# Patient Record
Sex: FEMALE | Race: BLACK OR AFRICAN AMERICAN | HISPANIC OR LATINO | Employment: FULL TIME | RURAL
[De-identification: names, ages, dates, MRNs, and addresses within clinical notes are randomized per-mention and may not be internally consistent; named-entity substitution may affect disease eponyms.]

---

## 2021-06-17 RX ORDER — LAMOTRIGINE 200 MG/1
200 TABLET ORAL DAILY
COMMUNITY

## 2021-06-17 RX ORDER — PREDNISONE 5 MG/1
5 TABLET ORAL DAILY
COMMUNITY
End: 2022-09-18

## 2021-06-17 RX ORDER — HYDROXYCHLOROQUINE SULFATE 200 MG/1
200 TABLET, FILM COATED ORAL DAILY
COMMUNITY

## 2021-06-17 RX ORDER — NAPROXEN 500 MG/1
500 TABLET ORAL 2 TIMES DAILY
COMMUNITY

## 2021-06-17 RX ORDER — LISINOPRIL AND HYDROCHLOROTHIAZIDE 20; 25 MG/1; MG/1
1 TABLET ORAL DAILY
COMMUNITY

## 2021-06-17 RX ORDER — LEVOTHYROXINE SODIUM 125 UG/1
150 TABLET ORAL
COMMUNITY

## 2021-06-17 RX ORDER — QUETIAPINE FUMARATE 300 MG/1
TABLET, FILM COATED ORAL
COMMUNITY

## 2021-06-22 ENCOUNTER — OFFICE VISIT (OUTPATIENT)
Dept: OBSTETRICS AND GYNECOLOGY | Facility: CLINIC | Age: 51
End: 2021-06-22
Payer: COMMERCIAL

## 2021-06-22 VITALS
DIASTOLIC BLOOD PRESSURE: 71 MMHG | HEIGHT: 62 IN | BODY MASS INDEX: 51.16 KG/M2 | WEIGHT: 278 LBS | SYSTOLIC BLOOD PRESSURE: 126 MMHG | RESPIRATION RATE: 18 BRPM | HEART RATE: 88 BPM | TEMPERATURE: 97 F

## 2021-06-22 DIAGNOSIS — Z12.72 SCREENING FOR VAGINAL CANCER: Primary | ICD-10-CM

## 2021-06-22 DIAGNOSIS — Z01.419 ROUTINE GYNECOLOGICAL EXAMINATION: ICD-10-CM

## 2021-06-22 PROCEDURE — 88142 CYTOPATH C/V THIN LAYER: CPT | Mod: GCY | Performed by: OBSTETRICS & GYNECOLOGY

## 2021-06-22 PROCEDURE — 99396 PR PREVENTIVE VISIT,EST,40-64: ICD-10-PCS | Mod: ,,, | Performed by: OBSTETRICS & GYNECOLOGY

## 2021-06-22 PROCEDURE — 87624 HPV HI-RISK TYP POOLED RSLT: CPT | Mod: ,,, | Performed by: CLINICAL MEDICAL LABORATORY

## 2021-06-22 PROCEDURE — 99396 PREV VISIT EST AGE 40-64: CPT | Mod: ,,, | Performed by: OBSTETRICS & GYNECOLOGY

## 2021-06-22 PROCEDURE — 87624 HUMAN PAPILLOMAVIRUS (HPV): ICD-10-PCS | Mod: ,,, | Performed by: CLINICAL MEDICAL LABORATORY

## 2021-06-22 RX ORDER — SULFASALAZINE 500 MG/1
1000 TABLET, DELAYED RELEASE ORAL 2 TIMES DAILY
COMMUNITY
End: 2022-09-18

## 2021-06-22 RX ORDER — ERGOCALCIFEROL 1.25 MG/1
50000 CAPSULE ORAL
COMMUNITY

## 2021-06-22 RX ORDER — LEVOTHYROXINE SODIUM 150 UG/1
150 TABLET ORAL
COMMUNITY

## 2021-06-23 LAB
GH SERPL-MCNC: NORMAL NG/ML
INSULIN SERPL-ACNC: NORMAL U[IU]/ML
LAB AP CLINICAL INFORMATION: NORMAL
LAB AP GYN INTERPRETATION: NEGATIVE
LAB AP PAP DISCLAIMER COMMENTS: NORMAL
RENIN PLAS-CCNC: NORMAL NG/ML/H

## 2021-06-26 LAB
HPV 16: NEGATIVE
HPV 18: NEGATIVE
HPV OTHER: NEGATIVE

## 2021-10-25 ENCOUNTER — OFFICE VISIT (OUTPATIENT)
Dept: FAMILY MEDICINE | Facility: CLINIC | Age: 51
End: 2021-10-25
Payer: COMMERCIAL

## 2021-10-25 VITALS
DIASTOLIC BLOOD PRESSURE: 90 MMHG | RESPIRATION RATE: 18 BRPM | TEMPERATURE: 98 F | OXYGEN SATURATION: 98 % | WEIGHT: 278 LBS | HEIGHT: 62 IN | SYSTOLIC BLOOD PRESSURE: 132 MMHG | HEART RATE: 79 BPM | BODY MASS INDEX: 51.16 KG/M2

## 2021-10-25 DIAGNOSIS — R60.0 LEG EDEMA, LEFT: Primary | ICD-10-CM

## 2021-10-25 LAB
ALBUMIN SERPL BCP-MCNC: 3.6 G/DL (ref 3.5–5)
ALBUMIN/GLOB SERPL: 1 {RATIO}
ALP SERPL-CCNC: 79 U/L (ref 41–108)
ALT SERPL W P-5'-P-CCNC: 34 U/L (ref 13–56)
ANION GAP SERPL CALCULATED.3IONS-SCNC: 8 MMOL/L (ref 7–16)
AST SERPL W P-5'-P-CCNC: 28 U/L (ref 15–37)
BASOPHILS # BLD AUTO: 0.04 K/UL (ref 0–0.2)
BASOPHILS NFR BLD AUTO: 0.4 % (ref 0–1)
BILIRUB SERPL-MCNC: 0.3 MG/DL (ref 0–1.2)
BUN SERPL-MCNC: 11 MG/DL (ref 7–18)
BUN/CREAT SERPL: 15 (ref 6–20)
CALCIUM SERPL-MCNC: 9.9 MG/DL (ref 8.5–10.1)
CHLORIDE SERPL-SCNC: 104 MMOL/L (ref 98–107)
CO2 SERPL-SCNC: 34 MMOL/L (ref 21–32)
CREAT SERPL-MCNC: 0.74 MG/DL (ref 0.55–1.02)
D DIMER PPP FEU-MCNC: 0.29 ΜG/ML (ref 0–0.47)
DIFFERENTIAL METHOD BLD: ABNORMAL
EOSINOPHIL # BLD AUTO: 0.01 K/UL (ref 0–0.5)
EOSINOPHIL NFR BLD AUTO: 0.1 % (ref 1–4)
ERYTHROCYTE [DISTWIDTH] IN BLOOD BY AUTOMATED COUNT: 14.8 % (ref 11.5–14.5)
GLOBULIN SER-MCNC: 3.5 G/DL (ref 2–4)
GLUCOSE SERPL-MCNC: 94 MG/DL (ref 74–106)
HCT VFR BLD AUTO: 40.3 % (ref 38–47)
HGB BLD-MCNC: 13.9 G/DL (ref 12–16)
IMM GRANULOCYTES # BLD AUTO: 0.07 K/UL (ref 0–0.04)
IMM GRANULOCYTES NFR BLD: 0.8 % (ref 0–0.4)
LYMPHOCYTES # BLD AUTO: 2.96 K/UL (ref 1–4.8)
LYMPHOCYTES NFR BLD AUTO: 32.6 % (ref 27–41)
MCH RBC QN AUTO: 28.7 PG (ref 27–31)
MCHC RBC AUTO-ENTMCNC: 34.5 G/DL (ref 32–36)
MCV RBC AUTO: 83.3 FL (ref 80–96)
MONOCYTES # BLD AUTO: 0.63 K/UL (ref 0–0.8)
MONOCYTES NFR BLD AUTO: 6.9 % (ref 2–6)
MPC BLD CALC-MCNC: 11.4 FL (ref 9.4–12.4)
NEUTROPHILS # BLD AUTO: 5.38 K/UL (ref 1.8–7.7)
NEUTROPHILS NFR BLD AUTO: 59.2 % (ref 53–65)
NRBC # BLD AUTO: 0 X10E3/UL
NRBC, AUTO (.00): 0 %
PLATELET # BLD AUTO: 295 K/UL (ref 150–400)
POTASSIUM SERPL-SCNC: 3.4 MMOL/L (ref 3.5–5.1)
PROT SERPL-MCNC: 7.1 G/DL (ref 6.4–8.2)
RBC # BLD AUTO: 4.84 M/UL (ref 4.2–5.4)
SODIUM SERPL-SCNC: 143 MMOL/L (ref 136–145)
WBC # BLD AUTO: 9.09 K/UL (ref 4.5–11)

## 2021-10-25 PROCEDURE — 85025 CBC WITH DIFFERENTIAL: ICD-10-PCS | Mod: ,,, | Performed by: CLINICAL MEDICAL LABORATORY

## 2021-10-25 PROCEDURE — 99203 OFFICE O/P NEW LOW 30 MIN: CPT | Mod: ,,, | Performed by: FAMILY MEDICINE

## 2021-10-25 PROCEDURE — 80053 COMPREHEN METABOLIC PANEL: CPT | Mod: ,,, | Performed by: CLINICAL MEDICAL LABORATORY

## 2021-10-25 PROCEDURE — 85025 COMPLETE CBC W/AUTO DIFF WBC: CPT | Mod: ,,, | Performed by: CLINICAL MEDICAL LABORATORY

## 2021-10-25 PROCEDURE — 80053 COMPREHENSIVE METABOLIC PANEL: ICD-10-PCS | Mod: ,,, | Performed by: CLINICAL MEDICAL LABORATORY

## 2021-10-25 PROCEDURE — 85378 FIBRIN DEGRADE SEMIQUANT: CPT | Performed by: FAMILY MEDICINE

## 2021-10-25 PROCEDURE — 99203 PR OFFICE/OUTPT VISIT, NEW, LEVL III, 30-44 MIN: ICD-10-PCS | Mod: ,,, | Performed by: FAMILY MEDICINE

## 2021-10-25 RX ORDER — IBUPROFEN 600 MG/1
600 TABLET ORAL EVERY 6 HOURS PRN
Qty: 20 TABLET | Refills: 0 | Status: SHIPPED | OUTPATIENT
Start: 2021-10-25

## 2021-10-25 RX ORDER — SULFASALAZINE 500 MG/1
500 TABLET ORAL 2 TIMES DAILY
COMMUNITY
Start: 2021-10-13 | End: 2021-10-25

## 2021-10-25 RX ORDER — GABAPENTIN 300 MG/1
300 CAPSULE ORAL 3 TIMES DAILY PRN
Qty: 20 CAPSULE | Refills: 1 | Status: SHIPPED | OUTPATIENT
Start: 2021-10-25 | End: 2022-10-25

## 2021-10-25 RX ORDER — HYDROCHLOROTHIAZIDE 25 MG/1
TABLET ORAL
COMMUNITY
Start: 2021-08-19

## 2022-01-13 ENCOUNTER — TELEPHONE (OUTPATIENT)
Dept: OBSTETRICS AND GYNECOLOGY | Facility: CLINIC | Age: 52
End: 2022-01-13
Payer: COMMERCIAL

## 2022-01-13 NOTE — TELEPHONE ENCOUNTER
----- Message from Shikha Stein sent at 1/13/2022  3:23 PM CST -----  PT NEEDS REFILL, LETY ,  [place]

## 2022-01-13 NOTE — TELEPHONE ENCOUNTER
Verbal Rx per Dr. Lopez w/read back: Premarin 0.625 mg, 1 tab by mouth daily, dispense 90, refill 1    Contacted Mr [facility] at [phone]; Verbal Rx given to [facility] w/read back

## 2022-05-04 ENCOUNTER — TELEPHONE (OUTPATIENT)
Dept: OBSTETRICS AND GYNECOLOGY | Facility: CLINIC | Age: 52
End: 2022-05-04
Payer: COMMERCIAL

## 2022-05-04 NOTE — TELEPHONE ENCOUNTER
----- Message from Shikha Stein sent at 5/3/2022  2:29 PM CDT -----  PT NEEDS REFILL ON HORMONE PILLS,  [place], [phone]

## 2022-07-25 ENCOUNTER — OFFICE VISIT (OUTPATIENT)
Dept: OBSTETRICS AND GYNECOLOGY | Facility: CLINIC | Age: 52
End: 2022-07-25
Payer: COMMERCIAL

## 2022-07-25 VITALS
DIASTOLIC BLOOD PRESSURE: 79 MMHG | BODY MASS INDEX: 51.23 KG/M2 | RESPIRATION RATE: 18 BRPM | HEIGHT: 62 IN | HEART RATE: 80 BPM | WEIGHT: 278.38 LBS | SYSTOLIC BLOOD PRESSURE: 133 MMHG

## 2022-07-25 DIAGNOSIS — Z12.72 ENCOUNTER FOR SCREENING FOR MALIGNANT NEOPLASM OF VAGINA: Primary | ICD-10-CM

## 2022-07-25 PROCEDURE — 88142 CYTOPATH C/V THIN LAYER: CPT | Mod: GCY | Performed by: OBSTETRICS & GYNECOLOGY

## 2022-07-25 PROCEDURE — 99396 PREV VISIT EST AGE 40-64: CPT | Mod: ,,, | Performed by: OBSTETRICS & GYNECOLOGY

## 2022-07-25 PROCEDURE — 99396 PR PREVENTIVE VISIT,EST,40-64: ICD-10-PCS | Mod: ,,, | Performed by: OBSTETRICS & GYNECOLOGY

## 2022-07-25 NOTE — PROGRESS NOTES
"CC: Well woman exam    Dania Felton is a 51 y.o. female  presents for well woman exam.  LMP: No LMP recorded. Patient has had a hysterectomy..  No issues, problems, or complaints.    Last mammogram: 2022 at [facility]    Past Medical History:   Diagnosis Date    Bipolar 1 disorder     History of chlamydia     Hypertension     Hypothyroid     RA (rheumatoid arthritis)     Screening for vaginal cancer 2016    negative     Past Surgical History:   Procedure Laterality Date    APPENDECTOMY       SECTION      COLLATERAL LIGAMENT REPAIR, KNEE Right     ENDOMETRIAL BIOPSY  2012    HYSTERECTOMY  07/10/2012    [facility]    OOPHORECTOMY Bilateral 07/10/2012    OVARIAN CYST REMOVAL      TOTAL KNEE ARTHROPLASTY Left     TUBAL LIGATION  2002     Social History     Socioeconomic History    Marital status:    Tobacco Use    Smoking status: Never Smoker    Smokeless tobacco: Never Used   Substance and Sexual Activity    Alcohol use: Yes     Comment: socially    Drug use: Never    Sexual activity: Yes     Partners: Male     Birth control/protection: See Surgical Hx     Family History   Problem Relation Age of Onset    Hypertension Father     Hypertension Mother     No Known Problems Son     No Known Problems Daughter      OB History        3    Para   3    Term   2       1    AB        Living   2       SAB        IAB        Ectopic        Multiple        Live Births   2                 /79   Pulse 80   Resp 18   Ht 5' 2" (1.575 m)   Wt 126.3 kg (278 lb 6.4 oz)   BMI 50.92 kg/m²       ROS:  GENERAL: Denies weight gain or weight loss. Feeling well overall.   SKIN: Denies rash or lesions.   HEAD: Denies head injury or headache.   NODES: Denies enlarged lymph nodes.   CHEST: Denies chest pain or shortness of breath.   CARDIOVASCULAR: Denies palpitations or left sided chest pain.   ABDOMEN: No abdominal pain, constipation, diarrhea, nausea, " vomiting or rectal bleeding.   URINARY: No frequency, dysuria, hematuria, or burning on urination.  REPRODUCTIVE: See HPI.   BREASTS: The patient performs breast self-examination and denies pain, lumps, or nipple discharge.   HEMATOLOGIC: No easy bruisability or excessive bleeding.   MUSCULOSKELETAL: Denies joint pain or swelling.   NEUROLOGIC: Denies syncope or weakness.   PSYCHIATRIC: Denies depression, anxiety or mood swings.    PHYSICAL EXAM:  APPEARANCE: Well nourished, well developed, in no acute distress.  AFFECT: WNL, alert and oriented x 3  SKIN: No acne or hirsutism  NECK: Neck symmetric without masses or thyromegaly  NODES: No inguinal, cervical, axillary, or femoral lymph node enlargement  CHEST: Good respiratory effect  ABDOMEN: Soft.  No tenderness or masses.  No hepatosplenomegaly.  No hernias.  BREASTS: Symmetrical, no skin changes or visible lesions.  No palpable masses, nipple discharge bilaterally.  PELVIC: Normal external genitalia without lesions.  Normal hair distribution.  Adequate perineal body, normal urethral meatus.  Vagina moist and well rugated without lesions or discharge.  Cervix  and uterus surgically absent. Adnexa without masses or tenderness.    EXTREMITIES: No edema.    Encounter for screening for malignant neoplasm of vagina  -     ThinPrep Pap Test; Future; Expected date: 07/25/2022            Patient was counseled today on A.C.S. Pap guidelines and recommendations for yearly pelvic exams, mammograms and monthly self breast exams; to see her PCP for other health maintenance.   Exercise regimen encouraged  Healthy food choices encouraged  Questions answered to desired level of satisfaction  Verbalized understanding to all information and instructions    Follow up in about 1 year (around 7/25/2023) for Annual Exam.

## 2022-08-03 ENCOUNTER — TELEPHONE (OUTPATIENT)
Dept: OBSTETRICS AND GYNECOLOGY | Facility: CLINIC | Age: 52
End: 2022-08-03
Payer: COMMERCIAL

## 2022-08-03 NOTE — TELEPHONE ENCOUNTER
----- Message from Jim Keith sent at 8/2/2022  2:14 PM CDT -----  Patient called to get more refills on her premirin, wanted to get the refills before she went out of town tomorrow. They can be sent to Mr. Castillo in [place]. She can be reached @ [phone]

## 2022-08-03 NOTE — TELEPHONE ENCOUNTER
Verbal Rx per Dr. Lopez w/read back: Premarin 0.625 mg, 1 tab PO once daily, dispense 90, refill 4    Contacted Mr. Anna Cervantes at [phone]; Verbal Rx given to [facility] w/read back

## 2022-09-18 ENCOUNTER — OFFICE VISIT (OUTPATIENT)
Dept: FAMILY MEDICINE | Facility: CLINIC | Age: 52
End: 2022-09-18
Payer: COMMERCIAL

## 2022-09-18 VITALS
DIASTOLIC BLOOD PRESSURE: 82 MMHG | HEIGHT: 62 IN | HEART RATE: 85 BPM | TEMPERATURE: 98 F | SYSTOLIC BLOOD PRESSURE: 126 MMHG | RESPIRATION RATE: 20 BRPM | BODY MASS INDEX: 50.24 KG/M2 | OXYGEN SATURATION: 96 % | WEIGHT: 273 LBS

## 2022-09-18 DIAGNOSIS — Z77.22 SECOND HAND SMOKE EXPOSURE: ICD-10-CM

## 2022-09-18 DIAGNOSIS — R05.8 DRY COUGH: ICD-10-CM

## 2022-09-18 DIAGNOSIS — J32.0 MAXILLARY SINUSITIS, UNSPECIFIED CHRONICITY: Primary | ICD-10-CM

## 2022-09-18 DIAGNOSIS — R44.8 FACIAL PRESSURE: ICD-10-CM

## 2022-09-18 DIAGNOSIS — J37.0 CHRONIC LARYNGITIS: ICD-10-CM

## 2022-09-18 DIAGNOSIS — R09.82 POST-NASAL DRAINAGE: ICD-10-CM

## 2022-09-18 PROBLEM — D72.829 LEUKOCYTOSIS: Status: ACTIVE | Noted: 2022-09-18

## 2022-09-18 PROBLEM — M06.9 RHEUMATOID ARTHRITIS: Status: ACTIVE | Noted: 2022-09-18

## 2022-09-18 PROBLEM — E66.9 OBESITY: Status: ACTIVE | Noted: 2022-09-18

## 2022-09-18 PROBLEM — F31.70: Status: ACTIVE | Noted: 2022-09-18

## 2022-09-18 PROBLEM — E03.9 HYPOTHYROIDISM: Status: ACTIVE | Noted: 2022-09-18

## 2022-09-18 PROBLEM — M25.571 BILATERAL ANKLE JOINT PAIN: Status: ACTIVE | Noted: 2022-09-18

## 2022-09-18 PROBLEM — M25.572 BILATERAL ANKLE JOINT PAIN: Status: ACTIVE | Noted: 2022-09-18

## 2022-09-18 PROBLEM — G47.33 OBSTRUCTIVE SLEEP APNEA (ADULT) (PEDIATRIC): Status: ACTIVE | Noted: 2022-09-18

## 2022-09-18 PROBLEM — H92.09 OTALGIA, UNSPECIFIED EAR: Status: ACTIVE | Noted: 2022-09-18

## 2022-09-18 PROBLEM — H60.92 UNSPECIFIED OTITIS EXTERNA, LEFT EAR: Status: ACTIVE | Noted: 2022-09-18

## 2022-09-18 PROBLEM — E78.5 HYPERLIPIDEMIA: Status: ACTIVE | Noted: 2022-09-18

## 2022-09-18 PROBLEM — M25.50 ARTHRALGIA: Status: ACTIVE | Noted: 2022-09-18

## 2022-09-18 PROBLEM — Z55.9 EDUCATIONAL CIRCUMSTANCE: Status: ACTIVE | Noted: 2022-09-18

## 2022-09-18 PROBLEM — F31.32 BIPOLAR AFFECTIVE DISORDER, CURRENTLY DEPRESSED, MODERATE: Status: ACTIVE | Noted: 2022-09-18

## 2022-09-18 PROBLEM — M19.90 OSTEOARTHRITIS: Status: ACTIVE | Noted: 2022-09-18

## 2022-09-18 PROBLEM — Z71.89 OTHER SPECIFIED COUNSELING: Status: ACTIVE | Noted: 2022-09-18

## 2022-09-18 PROBLEM — F34.1 NEUROTIC DEPRESSION: Status: ACTIVE | Noted: 2022-09-18

## 2022-09-18 PROBLEM — F31.9 BIPOLAR DISORDER, UNSPECIFIED: Status: ACTIVE | Noted: 2022-09-18

## 2022-09-18 PROBLEM — E55.9 VITAMIN D DEFICIENCY: Status: ACTIVE | Noted: 2022-09-18

## 2022-09-18 PROBLEM — I10 HYPERTENSION: Status: ACTIVE | Noted: 2022-09-18

## 2022-09-18 PROCEDURE — 99051 MED SERV EVE/WKEND/HOLIDAY: CPT | Mod: ,,, | Performed by: NURSE PRACTITIONER

## 2022-09-18 PROCEDURE — 99213 PR OFFICE/OUTPT VISIT, EST, LEVL III, 20-29 MIN: ICD-10-PCS | Mod: ,,, | Performed by: NURSE PRACTITIONER

## 2022-09-18 PROCEDURE — 99051 PR MEDICAL SERVICES, EVE/WKEND/HOLIDAY: ICD-10-PCS | Mod: ,,, | Performed by: NURSE PRACTITIONER

## 2022-09-18 PROCEDURE — 99213 OFFICE O/P EST LOW 20 MIN: CPT | Mod: ,,, | Performed by: NURSE PRACTITIONER

## 2022-09-18 RX ORDER — CEFDINIR 300 MG/1
300 CAPSULE ORAL 2 TIMES DAILY
Qty: 20 CAPSULE | Refills: 0 | Status: SHIPPED | OUTPATIENT
Start: 2022-09-18 | End: 2022-09-28

## 2022-09-18 RX ORDER — MELOXICAM 15 MG/1
15 TABLET ORAL DAILY
COMMUNITY
Start: 2022-09-01

## 2022-09-18 RX ORDER — PREDNISONE 20 MG/1
20 TABLET ORAL DAILY
Qty: 5 TABLET | Refills: 0 | Status: SHIPPED | OUTPATIENT
Start: 2022-09-18 | End: 2022-09-23

## 2022-09-18 RX ORDER — SULFASALAZINE 500 MG/1
500 TABLET ORAL 2 TIMES DAILY
COMMUNITY
Start: 2022-09-01

## 2022-09-18 RX ORDER — OMEPRAZOLE 20 MG/1
20 CAPSULE, DELAYED RELEASE ORAL DAILY
Qty: 30 CAPSULE | Refills: 11 | Status: SHIPPED | OUTPATIENT
Start: 2022-09-18 | End: 2023-09-18

## 2022-09-18 RX ORDER — PROMETHAZINE HYDROCHLORIDE AND DEXTROMETHORPHAN HYDROBROMIDE 6.25; 15 MG/5ML; MG/5ML
5 SYRUP ORAL EVERY 6 HOURS PRN
Qty: 150 ML | Refills: 0 | Status: SHIPPED | OUTPATIENT
Start: 2022-09-18 | End: 2022-09-28

## 2022-09-18 NOTE — PROGRESS NOTES
"Subjective:       Patient ID: Dania Felton is a 52 y.o. female.    Chief Complaint: Cough (Laryngitis )    Presents to clinic as above. Has had a dry cough for over a month. She has pressure in maxillary sinusitis. Has chronic intermittent laryngitis.   Review of Systems   Constitutional:  Negative for chills, fever and malaise/fatigue.   Respiratory:  Positive for cough.    Cardiovascular: Negative.         Reviewed family, medical, surgical, and social history.    Objective:      /82 (BP Location: Right arm, Patient Position: Sitting, BP Method: Large (Manual))   Pulse 85   Temp 98.1 °F (36.7 °C) (Oral)   Resp 20   Ht 5' 2" (1.575 m)   Wt 123.8 kg (273 lb)   SpO2 96%   BMI 49.93 kg/m²   Physical Exam  Vitals and nursing note reviewed.   Constitutional:       General: She is not in acute distress.     Appearance: Normal appearance. She is not ill-appearing, toxic-appearing or diaphoretic.   HENT:      Head: Normocephalic.      Right Ear: Tympanic membrane, ear canal and external ear normal.      Left Ear: Tympanic membrane, ear canal and external ear normal.      Nose: Mucosal edema and congestion present. No rhinorrhea.      Right Turbinates: Enlarged and swollen.      Left Turbinates: Enlarged and swollen.      Right Sinus: Maxillary sinus tenderness present. No frontal sinus tenderness.      Left Sinus: Maxillary sinus tenderness present. No frontal sinus tenderness.      Mouth/Throat:      Mouth: Mucous membranes are moist.   Cardiovascular:      Rate and Rhythm: Normal rate and regular rhythm.      Heart sounds: Normal heart sounds.   Pulmonary:      Effort: Pulmonary effort is normal.      Breath sounds: Normal breath sounds.   Musculoskeletal:      Cervical back: Normal range of motion and neck supple.   Skin:     General: Skin is warm and dry.      Capillary Refill: Capillary refill takes less than 2 seconds.   Neurological:      Mental Status: She is alert and oriented to person, " place, and time.   Psychiatric:         Mood and Affect: Mood normal.         Behavior: Behavior normal.         Thought Content: Thought content normal.         Judgment: Judgment normal.          No visits with results within 1 Day(s) from this visit.   Latest known visit with results is:   Office Visit on 07/25/2022   Component Date Value Ref Range Status    Case Report 07/25/2022    Final                    Value:Pap Cytology                                      Case: [id number]                                   Authorizing Provider:  Elton Stringer MD    Collected:           07/25/2022                   Ordering Location:     [facility] Received:            07/25/2022 04:06 PM                                 - OB/GYN                                                                     First Screen:          CANDELARIA Vaca(ASCP)                                                    Specimen:    Liquid-Based Pap Test, Screening, Vagina                                                   Interpretation 07/25/2022 Negative              Final    General Categorization 07/25/2022 Negative for intraepithelial lesion or malignancy   Final    Specimen Adequacy 07/25/2022 Satisfactory for evaluation   Final    Clinical Information 07/25/2022    Final                    Value:This result contains rich text formatting which cannot be displayed here.    Disclaimer 07/25/2022    Final                    Value:This result contains rich text formatting which cannot be displayed here.      Assessment:       1. Maxillary sinusitis, unspecified chronicity    2. Chronic laryngitis    3. Second hand smoke exposure    4. Post-nasal drainage    5. Facial pressure    6. Dry cough        Plan:       Maxillary sinusitis, unspecified chronicity  -     cefdinir (OMNICEF) 300 MG capsule; Take 1 capsule (300 mg total) by mouth 2 (two) times daily. for 10 days  Dispense: 20 capsule; Refill: 0  -      promethazine-dextromethorphan (PROMETHAZINE-DM) 6.25-15 mg/5 mL Syrp; Take 5 mLs by mouth every 6 (six) hours as needed (cough).  Dispense: 150 mL; Refill: 0  -     predniSONE (DELTASONE) 20 MG tablet; Take 1 tablet (20 mg total) by mouth once daily. for 5 days  Dispense: 5 tablet; Refill: 0    Chronic laryngitis  -     X-Ray Sinuses 3 or more views; Future; Expected date: 09/18/2022  -     omeprazole (PRILOSEC) 20 MG capsule; Take 1 capsule (20 mg total) by mouth once daily.  Dispense: 30 capsule; Refill: 11  -     Ambulatory referral/consult to ENT; Future; Expected date: 09/25/2022    Second hand smoke exposure  -     X-Ray Chest PA And Lateral; Future; Expected date: 09/18/2022  -     Ambulatory referral/consult to ENT; Future; Expected date: 09/25/2022    Post-nasal drainage  -     X-Ray Sinuses 3 or more views; Future; Expected date: 09/18/2022  -     Ambulatory referral/consult to ENT; Future; Expected date: 09/25/2022    Facial pressure  -     X-Ray Sinuses 3 or more views; Future; Expected date: 09/18/2022    Dry cough  -     X-Ray Chest PA And Lateral; Future; Expected date: 09/18/2022  F/U with referral clerk in 1 week  RTC PRN          Risks, benefits, and side effects were discussed with the patient. All questions were answered to the fullest satisfaction of the patient, and pt verbalized understanding and agreement to treatment plan. Pt was to call with any new or worsening symptoms, or present to the ER.

## 2022-09-20 ENCOUNTER — TELEPHONE (OUTPATIENT)
Dept: FAMILY MEDICINE | Facility: CLINIC | Age: 52
End: 2022-09-20
Payer: COMMERCIAL

## 2022-09-20 NOTE — TELEPHONE ENCOUNTER
Pt notified. Voiced understanding.----- Message from NATHANIEL Santos sent at 9/18/2022  2:32 PM CDT -----  Notify of normal sinus xray. Finish antibiotic anyway

## 2022-09-23 ENCOUNTER — OFFICE VISIT (OUTPATIENT)
Dept: OTOLARYNGOLOGY | Facility: CLINIC | Age: 52
End: 2022-09-23
Payer: COMMERCIAL

## 2022-09-23 VITALS — WEIGHT: 273 LBS | HEIGHT: 62 IN | BODY MASS INDEX: 50.24 KG/M2

## 2022-09-23 DIAGNOSIS — Z77.22 SECOND HAND SMOKE EXPOSURE: ICD-10-CM

## 2022-09-23 DIAGNOSIS — R09.82 POST-NASAL DRAINAGE: ICD-10-CM

## 2022-09-23 DIAGNOSIS — K21.9 GASTROESOPHAGEAL REFLUX DISEASE WITHOUT ESOPHAGITIS: Primary | ICD-10-CM

## 2022-09-23 DIAGNOSIS — J37.0 CHRONIC LARYNGITIS: ICD-10-CM

## 2022-09-23 PROCEDURE — 99204 PR OFFICE/OUTPT VISIT, NEW, LEVL IV, 45-59 MIN: ICD-10-PCS | Mod: S$PBB,25,, | Performed by: OTOLARYNGOLOGY

## 2022-09-23 PROCEDURE — 31575 PR LARYNGOSCOPY, FLEXIBLE; DIAGNOSTIC: ICD-10-PCS | Mod: S$PBB,,, | Performed by: OTOLARYNGOLOGY

## 2022-09-23 PROCEDURE — 99214 OFFICE O/P EST MOD 30 MIN: CPT | Mod: PBBFAC,25 | Performed by: OTOLARYNGOLOGY

## 2022-09-23 PROCEDURE — 99204 OFFICE O/P NEW MOD 45 MIN: CPT | Mod: S$PBB,25,, | Performed by: OTOLARYNGOLOGY

## 2022-09-23 PROCEDURE — 31575 DIAGNOSTIC LARYNGOSCOPY: CPT | Mod: PBBFAC | Performed by: OTOLARYNGOLOGY

## 2022-09-23 PROCEDURE — 31575 DIAGNOSTIC LARYNGOSCOPY: CPT | Mod: S$PBB,,, | Performed by: OTOLARYNGOLOGY

## 2022-09-23 NOTE — PROGRESS NOTES
Subjective:       Patient ID: Dania Felton is a 52 y.o. female.    Chief Complaint: Sinusitis (Patient presents to clinic today for sinusitis. )    Sinusitis    Review of Systems   HENT:  Positive for postnasal drip, rhinorrhea and voice change.    All other systems reviewed and are negative.    Objective:      Physical Exam  General: NAD slightly hoarse   Head: Normocephalic, atraumatic, no facial asymmetry/normal strength,  Ears: Both auricules normal in appearance, w/o deformities tympanic membranes normal external auditory canals normal  Nose: External nose w/o deformities normal turbinates no drainage or inflammation  Oral Cavity: Lips, gums, floor of mouth, tongue hard palate, and buccal mucosa without mass/lesion  Oropharynx: Mucosa pink and moist, soft palate, posterior pharynx and oropharyngeal wall without mass/lesion  Neck: Supple, symmetric, trachea midline, no palpable mass/lesion, no palpable cervical lymphadenopathy  Skin: Warm and dry, no concerning lesions  Respiratory: Respirations even, unlabored     Nasal/Nasopharyngo/Laryn/Hypopharyngoscopy Procedures    Procedure:  Diagnostic nasal, nasopharyngoscopy, laryngoscopy and hypopharyngoscopy.    Routine preparation with local atomizer with 1% neosynephrine and lidocaine . With customary flexible endoscope.     NOSE:   External:  No gross deformity   Intranasal:    Mucosa:  No polyps, ulcers or lesions.    Septum:  No gross deformity.    Turbinates:  Not enlarged.    Nasopharynx:  No lesions.   Mucosa:  No lesions.   Posterior Choanae:  Patent.   Eustachian Tubes:  Patent.  Larynx/hypopharynx:   Epiglottis:  No lesions, without edema.   AE Folds:  No lesions.   Vocal cords:  No polyps; nl mobility mild hint of nodules    Subglottis: No obvious stenosis   Hypopharynx:  No lesions.   Piriform sinus:  No pooling or lesions.   Post Cricoid: erythema       Assessment:       1. Gastroesophageal reflux disease without esophagitis    2. Chronic  laryngitis    3. Second hand smoke exposure    4. Post-nasal drainage          Plan:       Prilosec   Zyrtec   F/u 1 months   Hydration

## 2023-01-12 DIAGNOSIS — Z12.11 COLON CANCER SCREENING: Primary | ICD-10-CM

## 2023-02-24 ENCOUNTER — HOSPITAL ENCOUNTER (OUTPATIENT)
Dept: GASTROENTEROLOGY | Facility: HOSPITAL | Age: 53
Discharge: HOME | End: 2023-02-24
Attending: INTERNAL MEDICINE
Payer: OTHER GOVERNMENT

## 2023-02-24 ENCOUNTER — ANESTHESIA EVENT (OUTPATIENT)
Dept: GASTROENTEROLOGY | Facility: HOSPITAL | Age: 53
End: 2023-02-24
Payer: OTHER GOVERNMENT

## 2023-02-24 ENCOUNTER — ANESTHESIA (OUTPATIENT)
Dept: GASTROENTEROLOGY | Facility: HOSPITAL | Age: 53
End: 2023-02-24
Payer: OTHER GOVERNMENT

## 2023-02-24 VITALS
OXYGEN SATURATION: 100 % | SYSTOLIC BLOOD PRESSURE: 145 MMHG | DIASTOLIC BLOOD PRESSURE: 88 MMHG | TEMPERATURE: 98 F | HEART RATE: 78 BPM | RESPIRATION RATE: 21 BRPM

## 2023-02-24 DIAGNOSIS — K64.8 INTERNAL HEMORRHOID: ICD-10-CM

## 2023-02-24 DIAGNOSIS — Z12.11 COLON CANCER SCREENING: ICD-10-CM

## 2023-02-24 DIAGNOSIS — Z83.719 FAMILY HISTORY OF COLONIC POLYPS: Primary | ICD-10-CM

## 2023-02-24 PROCEDURE — D9220A PRA ANESTHESIA: Mod: ,,, | Performed by: NURSE ANESTHETIST, CERTIFIED REGISTERED

## 2023-02-24 PROCEDURE — G0105 COLORECTAL SCRN; HI RISK IND: HCPCS | Performed by: INTERNAL MEDICINE

## 2023-02-24 PROCEDURE — 25000003 PHARM REV CODE 250: Performed by: NURSE ANESTHETIST, CERTIFIED REGISTERED

## 2023-02-24 PROCEDURE — G0105 COLORECTAL SCRN; HI RISK IND: ICD-10-PCS | Mod: ,,, | Performed by: INTERNAL MEDICINE

## 2023-02-24 PROCEDURE — G0105 COLORECTAL SCRN; HI RISK IND: HCPCS | Mod: ,,, | Performed by: INTERNAL MEDICINE

## 2023-02-24 PROCEDURE — D9220A PRA ANESTHESIA: ICD-10-PCS | Mod: ,,, | Performed by: NURSE ANESTHETIST, CERTIFIED REGISTERED

## 2023-02-24 PROCEDURE — 00812 ANES LWR INTST SCR COLSC: CPT

## 2023-02-24 PROCEDURE — 63600175 PHARM REV CODE 636 W HCPCS: Performed by: NURSE ANESTHETIST, CERTIFIED REGISTERED

## 2023-02-24 PROCEDURE — 37000008 HC ANESTHESIA 1ST 15 MINUTES

## 2023-02-24 RX ORDER — SODIUM CHLORIDE 0.9 % (FLUSH) 0.9 %
10 SYRINGE (ML) INJECTION
Status: DISCONTINUED | OUTPATIENT
Start: 2023-02-24 | End: 2023-02-25 | Stop reason: HOSPADM

## 2023-02-24 RX ORDER — LIDOCAINE HYDROCHLORIDE 20 MG/ML
INJECTION, SOLUTION EPIDURAL; INFILTRATION; INTRACAUDAL; PERINEURAL
Status: DISCONTINUED | OUTPATIENT
Start: 2023-02-24 | End: 2023-02-24

## 2023-02-24 RX ORDER — PROPOFOL 10 MG/ML
VIAL (ML) INTRAVENOUS
Status: DISCONTINUED | OUTPATIENT
Start: 2023-02-24 | End: 2023-02-24

## 2023-02-24 RX ADMIN — PROPOFOL 50 MG: 10 INJECTION, EMULSION INTRAVENOUS at 07:02

## 2023-02-24 RX ADMIN — PROPOFOL 100 MG: 10 INJECTION, EMULSION INTRAVENOUS at 07:02

## 2023-02-24 RX ADMIN — LIDOCAINE HYDROCHLORIDE 50 MG: 20 INJECTION, SOLUTION INTRAVENOUS at 07:02

## 2023-02-24 RX ADMIN — SODIUM CHLORIDE: 9 INJECTION, SOLUTION INTRAVENOUS at 07:02

## 2023-02-24 NOTE — DISCHARGE INSTRUCTIONS
Procedure Date  2/24/23     Impression  Overall Impression: No colon polyps were seen. Pre-diverticular spasm was present in the sigmoid and descending colon. Internal hemorrhoids were noted.     Recommendation  Repeat colonoscopy in 5 years is recommended.  High fiber diet              Disp: DC to home in stable condition. Resume diet, no driving x 24 hours. F/U with PCP as scheduled  Dx: family history of colon polyps, internal hemorrhoids    NO DRIVING, OPERATING EQUIPMENT, OR SIGNING LEGAL DOCUMENTS FOR 24 HOURS.

## 2023-02-24 NOTE — ANESTHESIA PREPROCEDURE EVALUATION
2023  Dania Felton is a 52 y.o., female.      Pre-op Assessment    I have reviewed the Patient Summary Reports.     I have reviewed the Nursing Notes. I have reviewed the NPO Status.   I have reviewed the Medications.     Review of Systems  Anesthesia Hx:  No problems with previous Anesthesia      Past Medical History:   Diagnosis Date    Bipolar 1 disorder     History of chlamydia     Hypertension     Hypothyroid     RA (rheumatoid arthritis)     Screening for vaginal cancer 2016    negative       Past Surgical History:   Procedure Laterality Date    APPENDECTOMY       SECTION      COLLATERAL LIGAMENT REPAIR, KNEE Right     ENDOMETRIAL BIOPSY  2012    [facility]ST[facility]Y  [facility]/10/2012    [facility]    OOPHORECTOMY Bilateral 07/10/2012    OVARIAN CYST REMOVAL      TOTAL KNEE ARTHROPLASTY Left     TUBAL LIGATION         Family History   Problem Relation Age of Onset    Hypertension Father     Hypertension Mother     No Known Problems Son     No Known Problems Daughter        Social History     Socioeconomic History    Marital status:    Tobacco Use    Smoking status: Never    Smokeless tobacco: Never   Substance and Sexual Activity    Alcohol use: Yes     Comment: socially    Drug use: Never    Sexual activity: Yes     Partners: Male     Birth control/protection: See Surgical Hx       Current Outpatient Medications   Medication Sig Dispense Refill    ergocalciferol (ERGOCALCIFEROL) 50,000 unit Cap Take 50,000 Units by mouth every 7 days.      estrogens, conjugated, (PREMARIN) 0.625 MG tablet Take 1 tablet (0.625 mg total) by mouth once daily. 30 tablet 11    gabapentin (NEURONTIN) 300 MG capsule Take 1 capsule (300 mg total) by mouth 3 (three) times daily as needed (pain). 20 capsule 1    hydroCHLOROthiazide (HYDRODIURIL) 25 MG tablet        hydrOXYchloroQUINE (PLAQUENIL) 200 mg tablet Take 200 mg by mouth once daily.      ibuprofen (ADVIL,MOTRIN) 600 MG tablet Take 1 tablet (600 mg total) by mouth every 6 (six) hours as needed for Pain. 20 tablet 0    lamoTRIgine (LAMICTAL) 200 MG tablet Take 200 mg by mouth once daily.      levothyroxine (SYNTHROID) 125 MCG tablet Take 150 mcg by mouth before breakfast.       levothyroxine (SYNTHROID) 150 MCG tablet Take 150 mcg by mouth before breakfast.      lisinopriL-hydrochlorothiazide (PRINZIDE,ZESTORETIC) 20-25 mg Tab Take 1 tablet by mouth once daily.      meloxicam (MOBIC) 15 MG tablet Take 15 mg by mouth once daily.      naproxen (NAPROSYN) 500 MG tablet Take 500 mg by mouth 2 (two) times daily.      omeprazole (PRILOSEC) 20 MG capsule Take 1 capsule (20 mg total) by mouth once daily. 30 capsule 11    QUEtiapine (SEROQUEL) 300 MG Tab Take by mouth.      sulfaSALAzine (AZULFIDINE) 500 mg Tab Take 500 mg by mouth 2 (two) times daily.       No current facility-administered medications for this encounter.       Review of patient's allergies indicates:  No Known Allergies    Physical Exam  General: Well nourished, Cooperative, Alert and Oriented    Airway:  Mallampati: II   Mouth Opening: Normal  TM Distance: Normal  Tongue: Normal  Neck ROM: Normal ROM    Dental:  Intact       Patient Active Problem List   Diagnosis    Arthralgia    Bilateral ankle joint pain    Bipolar affective disorder, currently depressed, moderate    Bipolar disorder, unspecified    Hypertension    Educational circumstance    Hyperlipidemia    Hypothyroidism    Leukocytosis    Mixed bipolar I disorder in remission    Neurotic depression    Obesity    Obstructive sleep apnea (adult) (pediatric)    Osteoarthritis    Otalgia, unspecified ear    Other specified counseling    Rheumatoid arthritis    Unspecified otitis externa, left ear    Vitamin D deficiency       Anesthesia Plan  Type of Anesthesia, risks &  benefits discussed:    Anesthesia Type: Gen Natural Airway, MAC  Intra-op Monitoring Plan: Standard ASA Monitors  Post Op Pain Control Plan: multimodal analgesia  Induction:  IV  Informed Consent: Informed consent signed with the Patient and all parties understand the risks and agree with anesthesia plan.  All questions answered. Patient consented to blood products? Yes  ASA Score: 3  Day of Surgery Review of History & Physical: I have interviewed and examined the patient. I have reviewed the patient's H&P dated: There are no significant changes.     Ready For Surgery From Anesthesia Perspective.     .

## 2023-02-24 NOTE — TRANSFER OF CARE
Anesthesia Transfer of Care Note    Patient: Dania Leslie Jose Francisco    Procedure(s) Performed: * colonoscopy*    Patient location: GI    Anesthesia Type: general    Transport from OR: Transported from OR on room air with adequate spontaneous ventilation    Post pain: adequate analgesia    Post assessment: no apparent anesthetic complications    Post vital signs: stable    Level of consciousness: responds to stimulation    Nausea/Vomiting: no nausea/vomiting    Complications: none    Transfer of care protocol was followed      Last vitals:   Visit Vitals  /86   Pulse 100   Temp 36.8 °C (98.2 °F) (Oral)   Resp 16   SpO2 98%   Breastfeeding No

## 2023-02-24 NOTE — ANESTHESIA POSTPROCEDURE EVALUATION
Anesthesia Post Evaluation    Patient: Dania Felton    Procedure(s) Performed: * colonoscopy *    Final Anesthesia Type: general      Patient location during evaluation: GI PACU  Patient participation: Yes- Able to Participate  Level of consciousness: awake and alert  Post-procedure vital signs: reviewed and stable  Pain management: adequate  Airway patency: patent    PONV status at discharge: No PONV  Anesthetic complications: no      Cardiovascular status: blood pressure returned to baseline and hemodynamically stable  Respiratory status: spontaneous ventilation  Hydration status: euvolemic  Follow-up not needed.          Vitals Value Taken Time   /85 02/24/23 0825   Temp 98.4f 02/24/23 0825   Pulse 78 02/24/23 0825   Resp 16 02/24/23 0825   SpO2 100 % 02/24/23 0825   Vitals shown include unvalidated device data.      Event Time   Out of Recovery 08:53:01         Pain/Zachariah Score: Zachariah Score: 10 (2/24/2023  8:06 AM)

## 2023-02-24 NOTE — H&P
[facility]  Gastroenterology  H&P    Patient Name: Dania Felton  MRN: [id number]  Admission Date: 2023  Code Status: No Order    Attending Provider: Jaron Gurrola MD   Primary Care Physician: Gwyn Farfan MD  Principal Problem:<principal problem not specified>    Subjective:     History of Present Illness: Pt has family hx of colon polyps in father. This is her first colonoscopy.    Past Medical History:   Diagnosis Date    Bipolar 1 disorder     History of chlamydia     Hypertension     Hypothyroid     RA (rheumatoid arthritis)     Screening for vaginal cancer 2016    negative       Past Surgical History:   Procedure Laterality Date    APPENDECTOMY       SECTION      COLLATERAL LIGAMENT REPAIR, KNEE Right     ENDOMETRIAL BIOPSY  2012    [facility]STERECTOMY  07/10/2012    [facility]    OOPHORECTOMY Bilateral 07/10/2012    OVARIAN CYST REMOVAL      TOTAL KNEE ARTHROPLASTY Bilateral     TUBAL LIGATION         Review of patient's allergies indicates:  No Known Allergies  Family History       Problem Relation (Age of Onset)    Hypertension Father, Mother    No Known Problems Son, Daughter          Tobacco Use    Smoking status: Never    Smokeless tobacco: Never   Substance and Sexual Activity    Alcohol use: Yes     Comment: socially    Drug use: Never    Sexual activity: Yes     Partners: Male     Birth control/protection: See Surgical Hx     Review of Systems   Respiratory: Negative.     Cardiovascular: Negative.    Gastrointestinal: Negative.    Objective:     Vital Signs (Most Recent):  Pulse: 89 (23 07)  Resp: 16 (23)  BP: 126/83 (23 0733)  SpO2: (!) 94 % (23 0733)   Vital Signs (24h Range):  Pulse:  [89] 89  Resp:  [16] 16  SpO2:  [94 %] 94 %  BP: (126)/(83) 126/83        There is no height or weight on file to calculate BMI.    No intake or output data in the 24 hours ending 23 0745    Lines/Drains/Airways       Peripheral  Intravenous Line  Duration                  Peripheral IV - Single Lumen 02/24/23 0732 22 G Right Antecubital <1 day                    Physical Exam  Vitals reviewed.   Constitutional:       General: She is not in acute distress.     Appearance: Normal appearance. She is well-developed. She is obese. She is not ill-appearing.   HENT:      Head: Normocephalic and atraumatic.      Nose: Nose normal.   Eyes:      Pupils: Pupils are equal, round, and reactive to light.   Cardiovascular:      Rate and Rhythm: Normal rate and regular rhythm.   Pulmonary:      Effort: Pulmonary effort is normal.      Breath sounds: Normal breath sounds. No wheezing.   Abdominal:      General: Abdomen is flat. Bowel sounds are normal. There is no distension.      Palpations: Abdomen is soft.      Tenderness: There is no abdominal tenderness. There is no guarding.   Skin:     General: Skin is warm and dry.      Coloration: Skin is not jaundiced.   Neurological:      Mental Status: She is alert.   Psychiatric:         Attention and Perception: Attention normal.         Mood and Affect: Affect normal.         Speech: Speech normal.         Behavior: Behavior is cooperative.      Comments: Pt was calm while speaking.       Significant Labs:  CBC: No results for input(s): WBC, HGB, HCT, PLT in the last 48 hours.  CMP: No results for input(s): GLU, CALCIUM, ALBUMIN, PROT, NA, K, CO2, CL, BUN, CREATININE, ALKPHOS, ALT, AST, BILITOT in the last 48 hours.    Significant Imaging:  Imaging results within the past 24 hours have been reviewed.    Assessment/Plan:     There are no hospital problems to display for this patient.        Imp: family history of colon polyps  Plan: colonoscopy    Jaron Gurrola MD  Gastroenterology  [facility]

## 2024-03-12 RX ORDER — ESTROGENS, CONJUGATED 0.62 MG/1
0.62 TABLET, FILM COATED ORAL
Qty: 90 TABLET | OUTPATIENT
Start: 2024-03-12

## 2024-04-15 ENCOUNTER — OFFICE VISIT (OUTPATIENT)
Dept: FAMILY MEDICINE | Facility: CLINIC | Age: 54
End: 2024-04-15
Payer: COMMERCIAL

## 2024-04-15 VITALS
HEART RATE: 77 BPM | RESPIRATION RATE: 18 BRPM | BODY MASS INDEX: 48.58 KG/M2 | OXYGEN SATURATION: 98 % | TEMPERATURE: 98 F | SYSTOLIC BLOOD PRESSURE: 120 MMHG | DIASTOLIC BLOOD PRESSURE: 81 MMHG | HEIGHT: 62 IN | WEIGHT: 264 LBS

## 2024-04-15 DIAGNOSIS — M79.672 LEFT FOOT PAIN: ICD-10-CM

## 2024-04-15 DIAGNOSIS — S93.492A SPRAIN OF OTHER LIGAMENT OF LEFT ANKLE, INITIAL ENCOUNTER: Primary | ICD-10-CM

## 2024-04-15 PROCEDURE — 3079F DIAST BP 80-89 MM HG: CPT | Mod: ,,, | Performed by: NURSE PRACTITIONER

## 2024-04-15 PROCEDURE — 99214 OFFICE O/P EST MOD 30 MIN: CPT | Mod: 25,,, | Performed by: NURSE PRACTITIONER

## 2024-04-15 PROCEDURE — 3074F SYST BP LT 130 MM HG: CPT | Mod: ,,, | Performed by: NURSE PRACTITIONER

## 2024-04-15 PROCEDURE — 96372 THER/PROPH/DIAG INJ SC/IM: CPT | Mod: ,,, | Performed by: NURSE PRACTITIONER

## 2024-04-15 PROCEDURE — 3008F BODY MASS INDEX DOCD: CPT | Mod: ,,, | Performed by: NURSE PRACTITIONER

## 2024-04-15 PROCEDURE — 99051 MED SERV EVE/WKEND/HOLIDAY: CPT | Mod: ,,, | Performed by: NURSE PRACTITIONER

## 2024-04-15 PROCEDURE — 1159F MED LIST DOCD IN RCRD: CPT | Mod: ,,, | Performed by: NURSE PRACTITIONER

## 2024-04-15 RX ORDER — TRAMADOL HYDROCHLORIDE 50 MG/1
50 TABLET ORAL EVERY 8 HOURS PRN
Qty: 20 TABLET | Refills: 0 | Status: SHIPPED | OUTPATIENT
Start: 2024-04-15

## 2024-04-15 RX ORDER — FLUTICASONE PROPIONATE 50 MCG
2 SPRAY, SUSPENSION (ML) NASAL DAILY
COMMUNITY
Start: 2023-10-19 | End: 2024-10-19

## 2024-04-15 RX ORDER — KETOROLAC TROMETHAMINE 30 MG/ML
60 INJECTION, SOLUTION INTRAMUSCULAR; INTRAVENOUS
Status: COMPLETED | OUTPATIENT
Start: 2024-04-15 | End: 2024-04-15

## 2024-04-15 RX ORDER — CETIRIZINE HYDROCHLORIDE 10 MG/1
10 TABLET ORAL DAILY
COMMUNITY
Start: 2023-10-19 | End: 2024-10-19

## 2024-04-15 RX ADMIN — KETOROLAC TROMETHAMINE 60 MG: 30 INJECTION, SOLUTION INTRAMUSCULAR; INTRAVENOUS at 08:04

## 2024-04-15 NOTE — LETTER
April 15, 2024    Dania Felton  [address] [facility] [facility] [place] [address]             [facility]  Family Medicine  [address] [facility] [place] [address]  Phone: [phone]  Fax: [phone]   April 15, 2024     Patient: Dania Felton   YOB: 1970   Date of Visit: 4/15/2024       To Whom it May Concern:    Dania Felton was seen in my clinic on 4/15/2024. She may return to work on 04/17/2024 . Daughter, Hina Leslie, was with patient during visit.    Please excuse her from any classes or work missed.    If you have any questions or concerns, please don't hesitate to call.    Sincerely,         Bebe Paige, SHILPAP

## 2024-04-15 NOTE — LETTER
April 15, 2024    Dania Felton  [address] [facility] [place] [address]             [facility]  Family Medicine  [address] [facility] [place] [address]  Phone: [phone]  Fax: [phone]   April 15, 2024     Patient: Dania Felton   YOB: 1970   Date of Visit: 4/15/2024       To Whom it May Concern:    Dania Felton was seen in my clinic on 4/15/2024. She may return to work on 04/17/2024 .    Please excuse her from any classes or work missed.    If you have any questions or concerns, please don't hesitate to call.    Sincerely,         Bebe Paige, SHILPAP

## 2024-04-16 NOTE — PROGRESS NOTES
"Subjective:       Patient ID: Dania Felton is a 53 y.o. female.    Vitals:  height is 5' 2" (1.575 m) and weight is 119.7 kg (264 lb). Her oral temperature is 98.4 °F (36.9 °C). Her blood pressure is 120/81 and her pulse is 77. Her respiration is 18 and oxygen saturation is 98%.     Chief Complaint: Foot Pain (Left- onset of approximately 3 hours ago at work; pt reports stepped wrong on 3 steps and her foot/ankle has been swollen since. Pt describes as throbbing, sharp pains)    Ankle Pain  Patient complains of left ankle pain. Onset of the symptoms was today. Inciting event: injured while stepping off steps. Current symptoms include: inability to bear weight, stiffness, and swelling. Aggravating factors: direct pressure, standing, walking , and weight bearing. Symptoms have gradually worsened. Patient has had no prior ankle problems. Previous visits for this problem: none.  Evaluation to date: none.  Treatment to date: avoidance of offending activity and OTC analgesics which are not very effective.            Musculoskeletal:  Positive for pain, joint pain, joint swelling and abnormal ROM of joint.         Objective:      Physical Exam  Vitals reviewed.   Constitutional:       Appearance: Normal appearance. She is obese.   HENT:      Mouth/Throat:      Mouth: Mucous membranes are moist.   Eyes:      Conjunctiva/sclera: Conjunctivae normal.   Cardiovascular:      Rate and Rhythm: Normal rate and regular rhythm.      Heart sounds: No murmur heard.  Pulmonary:      Effort: Pulmonary effort is normal.      Breath sounds: Normal breath sounds.   Musculoskeletal:      Left ankle: Swelling present. Tenderness present over the lateral malleolus and medial malleolus. Decreased range of motion. Normal pulse.   Neurological:      Mental Status: She is alert and oriented to person, place, and time.      Gait: Gait normal.   Psychiatric:         Mood and Affect: Mood normal.         Behavior: Behavior normal.         " Thought Content: Thought content normal.         Judgment: Judgment normal.              Assessment:       1. Sprain of other ligament of left ankle, initial encounter    2. Left foot pain        X-Ray Ankle 2 View Left  Narrative: EXAMINATION:  XR ANKLE 2 VIEW LEFT    CLINICAL HISTORY:  Pain in left foot    TECHNIQUE:  Left ankle, AP lateral and oblique views:    COMPARISON:  None.    FINDINGS:  Mild diffuse soft tissue swelling is present.  There is no evidence of an ankle effusion or fracture.  Degenerative changes are seen in the midfoot and to a lesser degree at the talotibial joint.  Impression: No acute abnormalities are seen.  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    Place of service: [facility]    Electronically signed by: Honey Frausto  Date:    04/16/2024  Time:    09:33     Plan:         Sprain of other ligament of left ankle, initial encounter    Left foot pain  -     X-Ray Ankle 2 View Left; Future; Expected date: 04/15/2024  -     traMADoL (ULTRAM) 50 mg tablet; Take 1 tablet (50 mg total) by mouth every 8 (eight) hours as needed for Pain.  Dispense: 20 tablet; Refill: 0  -     ketorolac injection 60 mg  -     AIR CAST WALKER BOOT FOR HOME USE      Follow up if symptoms worsen or fail to improve.     No future appointments.       An After Visit Summary was printed and given to the patient.     Signature:    NATHANIEL Medina  Family Nurse Practitioner  [facility]    Date of encounter: 4/15/24

## 2024-06-18 DIAGNOSIS — M25.559 HIP PAIN: ICD-10-CM

## 2024-06-18 DIAGNOSIS — M25.569 KNEE PAIN: ICD-10-CM

## 2024-06-18 DIAGNOSIS — M06.9 RHEUMATOID ARTHRITIS: Primary | ICD-10-CM

## 2024-06-18 DIAGNOSIS — M25.579 ANKLE PAIN: ICD-10-CM

## 2024-06-26 ENCOUNTER — CLINICAL SUPPORT (OUTPATIENT)
Dept: REHABILITATION | Facility: HOSPITAL | Age: 54
End: 2024-06-26
Payer: OTHER GOVERNMENT

## 2024-06-26 DIAGNOSIS — R29.898 WEAKNESS OF LEFT FOOT: ICD-10-CM

## 2024-06-26 DIAGNOSIS — M76.822 LEFT TIBIALIS POSTERIOR TENDINITIS: Primary | ICD-10-CM

## 2024-06-26 DIAGNOSIS — M06.9 RHEUMATOID ARTHRITIS: ICD-10-CM

## 2024-06-26 DIAGNOSIS — M25.572 ACUTE LEFT ANKLE PAIN: ICD-10-CM

## 2024-06-26 DIAGNOSIS — M25.672 DECREASED RANGE OF MOTION OF LEFT ANKLE: ICD-10-CM

## 2024-06-26 PROCEDURE — 97110 THERAPEUTIC EXERCISES: CPT

## 2024-06-26 PROCEDURE — 97162 PT EVAL MOD COMPLEX 30 MIN: CPT

## 2024-06-26 NOTE — PLAN OF CARE
"[facility]   Physical Therapy Initial Evaluation      Name: Dania Felton  Clinic Number: [id number]    Therapy Diagnosis: Left Ankle Pain   Encounter Diagnosis   Name Primary?    Rheumatoid arthritis         Physician: Latricia Muñoz, FLAVIO    Physician Orders: PT Eval and Treat   Medical Diagnosis from Referral: Rheumatoid Arthritis   Evaluation Date: 6/26/2024  Authorization Period Expiration: 9/11/1014  Plan of Care Expiration: 8/23/2024  Visit # / Visits authorized: 1/ 6   FOTO: 52/100    Precautions: Standard     Time In: 5:35 pm   Time Out: 6:20 pm   Total Appointment Time (timed & untimed codes): 45 minutes    Subjective     Date of onset: 4/15/2024    History of current condition - DANIA reports: she was walking down stairs 4/15/2024 when she slipped and fell. She landed "funny" on the Left ankle and has had significant pain since that time. She did see her doctor on 4/15/2024 and MD placed her in a walking boot. She wore this for a while but weaned herself out of it as it was too bulky. She has continued to have pain in that ankle and has great difficulty moving the ankle, standing, and walking. X Rays at the time were negative for fractures but did show degenerative changes in the midfoot and talotibial joint. Suspect soft tissue damage at this point. MD ordered Outpatient Physical Therapy and patient is here today for the Evaluation.         Falls: 4/15/2024 she fell down steps    Imaging: X Rays 4/15/2024 Left Ankle :   FINDINGS:  Mild diffuse soft tissue swelling is present.  There is no evidence of an ankle effusion or fracture.  Degenerative changes are seen in the midfoot and to a lesser degree at the talotibial joint.     Impression:     No acute abnormalities are seen.  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    Prior Therapy: none  Social History:  lives with their family  Occupation: logistics in [employer]   Prior Level of Function: " "Independent and Active  Current Level of Function: Significant Left Ankle pain limits all mobility     Pain:  Current 6/10, worst 9/10, best 3/10   Location: left ankle   Description: Aching, Dull, Sharp, and Shooting  Aggravating Factors: weight bearing activities   Easing Factors: relaxation, pain medication, and rest; Tramadol; Naproxen     Patients goals: "I just want to be able to walk without hurting so bad."     Medical History:   Past Medical History:   Diagnosis Date    Bipolar 1 disorder     History of chlamydia     Hypertension     Hypothyroid     RA (rheumatoid arthritis)     Screening for vaginal cancer 2016    negative       Surgical History:   Dania Felton  has a past surgical history that includes  section; Appendectomy; Tubal ligation (); Ovarian cyst removal; Endometrial biopsy (2012); Hysterectomy (07/10/2012); Oophorectomy (Bilateral, 07/10/2012); Collateral ligament repair, knee (Right); and Total knee arthroplasty (Bilateral).    Medications:   Dania has a current medication list which includes the following prescription(s): cetirizine, ergocalciferol, estrogens (conjugated), fluticasone propionate, gabapentin, hydrochlorothiazide, hydroxychloroquine, ibuprofen, lamotrigine, levothyroxine, naproxen, omeprazole, quetiapine, sulfasalazine, and tramadol.    Allergies:   Review of patient's allergies indicates:  No Known Allergies     Objective        Body Type: obese    ANKLE OBSERVATION    Pronation: significant pes planus noted bilaterally         Left Lower Extremity  ROM/Strength Right lower Extremity     AROM PROM STRENGTH  AROM PROM STRENGTH      KNEE     Flexion  (140)                        Extension (0)                         10   3/5 ANKLE  Dorsiflexion     (20)        15  5/5    30  3-/5                Plantarflexion  (50) 50     5  2/5                Inversion         (35)     25     15  3/5                Eversion          (25) 15                      " "                          ANKLE SPECIAL TESTS    C. Ankle  Anterior drawer test: right Negative left Positive  Inversion stress test: right Negative left Negative  Eversion stress test: right Negative left Positive      Transfers:  She has pain with sit to stand and therefore she transfers most of her weight to the Right Lower Extremity with sit to stand and with prolonged standing     Ambulation:  Patient has decreased stance time and antalgic gait on the Left. Patient has significant pes planus bilaterally and is wearing non-supportive flip flops. She reports tennis shoes are painful due to her "fallen arches." Therapist educated patient on the importance of wearing shoes with good arch support.     Palpation : Upon palpation she has severe tenderness all along the posterior tibialis tendon. She has pain with resisted Inversion and pain with passive eversion when the post tib is placed on a stretch.  All of these tests reproduced her pain. Feel she has damage to the posterior tib tendon. Unable to rule out tendinitis vs tendon tear.       KNEE OBSERVATION    Patient has had bilateral Total Knee Replacements in the past         Limitation/Restriction for FOTO Intake Ankle Survey    Therapist reviewed FOTO scores for Dania Felton on 6/26/2024.   FOTO documents entered into [employer] - see Media section.    Limitation Score: 48%         Treatment     Total Treatment time (time-based codes) separate from Evaluation: 10 minutes       DANIA received the treatments listed below:  THERAPEUTIC EXERCISES to develop strength, ROM, and flexibility for 10 minutes including :  Therapist initiated Home Exercise Program for Ankle Active Range of Motion which includes Ankle Dorsiflexion/Plantarflexion, Inversion/Eversion, ankle circles clockwise and counterclockwise, and Gastroc stretch with towel.     Patient Education and Home Exercises     Education provided:   - Discussed the findings from the Evaluation, Reviewed the Plan " "of Care, and Instructed patient on their Home Exercise Program       Written Home Exercises Provided: yes. Exercises were reviewed and DANIA was able to demonstrate them prior to the end of the session.  DANIA demonstrated good  understanding of the education provided. See EMR under Patient Instructions for exercises provided during therapy sessions.    Assessment     Dania is a 53 y.o. female referred to outpatient Physical Therapy with a medical diagnosis of Left Ankle Pain. DANIA reports: she was walking down stairs 4/15/2024 when she slipped and fell. She landed "funny" on the Left ankle and has had significant pain since that time. She did see her doctor on 4/15/2024 and MD placed her in a walking boot. She wore this for a while but weaned herself out of it as it was too bulky. She has continued to have pain in that ankle and has great difficulty moving the ankle, standing, and walking. X Rays at the time were negative for fractures but did show degenerative changes in the midfoot and talotibial joint. Suspect soft tissue damage at this point. MD ordered Outpatient Physical Therapy and patient is here today for the Evaluation.   Upon palpation she has severe tenderness all along the posterior tibialis tendon. She has pain with resisted Inversion and pain with passive eversion when the post tib is placed on a stretch.  All of these tests reproduced her pain. Feel she has damage to the posterior tib tendon. Unable to rule out tendinitis vs tendon tear.   Patient presents with significantly decreased Range of Motion in Left ankle due to pain. She has decreased Left Ankle Strength. Patient has pain with all weight bearing activities. She has pain with sit to stand and therefore she transfers most of her weight to the Right Lower Extremity with sit to stand and with prolonged standing. During Ambulation, patient has decreased stance time and antalgic gait on the Left. Patient has significant pes planus bilaterally " "and is wearing non-supportive flip flops. She reports tennis shoes are painful due to her "fallen arches." Therapist educated patient on the importance of wearing shoes with good arch support.   Therapist initiated Home Exercise Program for Ankle Active Range of Motion which includes Ankle Dorsiflexion/Plantarflexion, Inversion/Eversion, ankle circles clockwise and counterclockwise, and Gastroc stretch with towel.   Patient will require Physical Therapy Intervention to address all deficits and work toward completion of all goals set. Therapist will refer patient back to MD upon completion of Therapy for further assessment and possible MRI if pain and dysfunction persist.         Patient prognosis is Guarded.   Patient will benefit from skilled outpatient Physical Therapy to address the deficits stated above and in the chart below, provide patient /family education, and to maximize patientt's level of independence.     Plan of care discussed with patient: Yes  Patient's spiritual, cultural and educational needs considered and patient is agreeable to the plan of care and goals as stated below:     Anticipated Barriers for therapy: Possible Posterior Tibialis Tendon Tear    Medical Necessity is demonstrated by the following  History  Co-morbidities and personal factors that may impact the plan of care [] LOW: no personal factors / co-morbidities  [] MODERATE: 1-2 personal factors / co-morbidities  [x] HIGH: 3+ personal factors / co-morbidities    Moderate / High Support Documentation:   Co-morbidities affecting plan of care:   Past Medical History:   Diagnosis Date    Bipolar 1 disorder     History of chlamydia     Hypertension     Hypothyroid     RA (rheumatoid arthritis)     Screening for vaginal cancer 2016    negative       has a past surgical history that includes  section; Appendectomy; Tubal ligation (); Ovarian cyst removal; Endometrial biopsy (2012); Hysterectomy (07/10/2012); " Oophorectomy (Bilateral, 07/10/2012); Collateral ligament repair, knee (Right); and Total knee arthroplasty (Bilateral).  Personal Factors:   no deficits     Examination  Body Structures and Functions, activity limitations and participation restrictions that may impact the plan of care [] LOW: addressing 1-2 elements  [x] MODERATE: 3+ elements  [] HIGH: 4+ elements (please support below)    Moderate / High Support Documentation:   Upon palpation she has severe tenderness all along the posterior tibialis tendon. She has pain with resisted Inversion and pain with passive eversion when the post tib is placed on a stretch.  All of these tests reproduced her pain. Feel she has damage to the posterior tib tendon. Unable to rule out tendinitis vs tendon tear.   Patient presents with significantly decreased Range of Motion in Left ankle due to pain. She has decreased Left Ankle Strength. Patient has pain with all weight bearing activities. She has pain with sit to stand and therefore she transfers most of her weight to the Right Lower Extremity with sit to stand and with prolonged standing. During Ambulation, patient has decreased stance time and antalgic gait on the Left. Patient has significant pes planus bilaterally      Clinical Presentation [] LOW: stable  [x] MODERATE: Evolving - Will likely require additional testing at the completion of Physical Therapy to determine the exact cause of patient's pain and dysfunction    [] HIGH: Unstable     Decision Making/ Complexity Score: moderate         Goals:  Short Term Goals: 4 weeks   1. Independent with Home Exercise Program   2. Increase Left Ankle Active Range of Motion to 15 Degrees for Dorsiflexion and 45 Degrees for Plantarflexion  3. Increase Left Ankle Active Range of Motion to 30 Degrees for Inversion and 20 Degrees for Eversion  4. Increase Left Ankle Strength to grossly 3+/5  5. Patient will ambulate 500 feet with Normal Gait pattern with supportive shoes with  pain less than or equal to 4/10    Long Term Goals: 8 weeks   1. Patient will increase Left Ankle Strength to grossly 4+/5  2. Patient will increase Left Ankle Active Range of Motion to Within Functional Limits all directions   3. Patient will ambulate 1000+ feet with complaints of pain less than or equal to 3/10 in Left Ankle   4. Will refer patient back to MD upon completion of Therapy for possible MRI if pain and dysfunction persists.     Plan     Plan of care Certification: 6/26/2024 to 8/23/2024.    Outpatient Physical Therapy 2 times weekly for 8 weeks to include the following interventions: 22262 [therapeutic exercise], 41149 [neuromuscular re-education], 36290 [gait training], 07966 [manual therapy], 85350 [therapeutic activities], 53628 [aquatic therapy], 85906 [ultrasound], and 79915 [unattended electrical stimulation]      LUIS ANTONIO JORDAN, PT, DPT

## 2024-06-27 PROBLEM — R29.898 WEAKNESS OF LEFT FOOT: Status: ACTIVE | Noted: 2024-06-27

## 2024-06-27 PROBLEM — M76.822 LEFT TIBIALIS POSTERIOR TENDINITIS: Status: ACTIVE | Noted: 2024-06-27

## 2024-06-27 PROBLEM — M25.672 DECREASED RANGE OF MOTION OF LEFT ANKLE: Status: ACTIVE | Noted: 2024-06-27

## 2024-06-27 PROBLEM — M25.572 ACUTE LEFT ANKLE PAIN: Status: ACTIVE | Noted: 2024-06-27

## 2024-06-28 NOTE — PROGRESS NOTES
See Plan of Care     Sup Visit performed today with RAMIREZ Phipps and RAMIREZ Barba.  All goals and treatment plan reviewed. Will work toward completion of all goals set.    First Treatment May Include :     Ultrasound/E Stim for pain relief    Bike/NuStep  SB  HAMSTRING STRETCH stretch     Ankle 4 way - Active Range of Motion / Resisted   Dorsiflexion   Plantarflexion   Inversion   Eversion   Circles     Towel scrunches     Hydraulic Ankle 4 way     Seated Heal Raises   Standing Heal Raises with ball squeeze (heels squeeze the ball)  Standing at rail - try to raise the arch (Supination)      Single Leg Stance - Firm   Single Leg Stance - Foam

## 2024-07-10 ENCOUNTER — CLINICAL SUPPORT (OUTPATIENT)
Dept: REHABILITATION | Facility: HOSPITAL | Age: 54
End: 2024-07-10
Payer: OTHER GOVERNMENT

## 2024-07-10 DIAGNOSIS — R29.898 WEAKNESS OF LEFT FOOT: ICD-10-CM

## 2024-07-10 DIAGNOSIS — M76.822 LEFT TIBIALIS POSTERIOR TENDINITIS: ICD-10-CM

## 2024-07-10 DIAGNOSIS — M25.572 ACUTE LEFT ANKLE PAIN: Primary | ICD-10-CM

## 2024-07-10 DIAGNOSIS — M25.672 DECREASED RANGE OF MOTION OF LEFT ANKLE: ICD-10-CM

## 2024-07-10 PROCEDURE — 97110 THERAPEUTIC EXERCISES: CPT | Mod: CQ

## 2024-07-10 PROCEDURE — 97140 MANUAL THERAPY 1/> REGIONS: CPT | Mod: CQ

## 2024-07-10 NOTE — PROGRESS NOTES
[facility]   Physical Therapy Treatment Note      Name: Dania Felton  Clinic Number: [id number]    Therapy Diagnosis:   Encounter Diagnoses   Name Primary?    Acute left ankle pain Yes    Decreased range of motion of left ankle     Weakness of left foot     Left tibialis posterior tendinitis      Physician: Latricia Muñoz NP    Visit Date: 7/10/2024    Therapy Diagnosis: Left Ankle Pain        Encounter Diagnosis   Name Primary?    Rheumatoid arthritis          Physician: Latricia Muñoz NP     Physician Orders: PT Eval and Treat   Medical Diagnosis from Referral: Rheumatoid Arthritis   Evaluation Date: 6/26/2024  Authorization Period Expiration: 9/11/1014  Plan of Care Expiration: 8/23/2024  Visit # / Visits authorized: 2 / 6   FOTO: 52/100     Precautions: Standard      Time In: 3:10 pm   Time Out: 3:52 pm   Total Appointment Time (timed & untimed codes): 42 minutes    PTA Visit #: 1/5       Subjective     Patient reports: no significant change in ankle pain.  She was compliant with home exercise program.  Response to previous treatment: First since eval  Functional change: None    Pain: 5/10  Location: left ankle    Prior Level of Function: Independent and Active  Current Level of Function: Significant Left Ankle pain limits all mobility     Objective      Objective Measures updated at progress report unless specified.     Treatment     DANIA received the treatments listed below:      therapeutic exercises to develop strength, endurance, ROM, and flexibility for 23 minutes including:  Bike/NuStep x 5 mins  SB (Towel Stretch) 5x20 sec  HAMSTRING STRETCH stretch      Ankle 4 way - Active Range of Motion / Resisted   Dorsiflexion   Plantarflexion   Inversion   Eversion   Circles 20x each way     Towel scrunches 2 min     Hydraulic Ankle 4 way      Seated Heal Raises   Standing Heal Raises with ball squeeze (heels squeeze the ball) 2x10  Soleus Raises (Seated) 15# 2x10       "  Single Leg Stance - Firm   Single Leg Stance - Foam        manual therapy techniques: Joint mobilizations and Manual traction & Graston were applied to the: foot for 15 minutes, including:  Graston, see assessment  Ice Massage    neuromuscular re-education activities to improve: Balance, Coordination, Kinesthetic, and Proprioception for 0 minutes. The following activities were included:          Patient Education and Home Exercises       Education provided:   - None    Written Home Exercises Provided: Patient instructed to cont prior HEP. Exercises were reviewed and DANIA was able to demonstrate them prior to the end of the session.  DANIA demonstrated good  understanding of the education provided. See Electronic Medical Record under Patient Instructions for exercises provided during therapy sessions    Assessment     Pt tolerated today's session well with decreased pain at end of session. Point tenderness to L navicular and along posterior tib tendon. Started session with Graston using GT-4 and GT-3 instruments along posterior tib insertion and distal muscle belly with some fibrotic areas noted. Followed this with stretching and light strengthening with no pain noted. Ended session with ice massage to help with inflammation. Pt shown how to make ice massage at home. Will progress as able.        Dania is a 53 y.o. female referred to outpatient Physical Therapy with a medical diagnosis of Left Ankle Pain. DANIA reports: she was walking down stairs 4/15/2024 when she slipped and fell. She landed "funny" on the Left ankle and has had significant pain since that time. She did see her doctor on 4/15/2024 and MD placed her in a walking boot. She wore this for a while but weaned herself out of it as it was too bulky. She has continued to have pain in that ankle and has great difficulty moving the ankle, standing, and walking. X Rays at the time were negative for fractures but did show degenerative changes in the midfoot " "and talotibial joint. Suspect soft tissue damage at this point. MD ordered Outpatient Physical Therapy and patient is here today for the Evaluation.   Upon palpation she has severe tenderness all along the posterior tibialis tendon. She has pain with resisted Inversion and pain with passive eversion when the post tib is placed on a stretch.  All of these tests reproduced her pain. Feel she has damage to the posterior tib tendon. Unable to rule out tendinitis vs tendon tear.   Patient presents with significantly decreased Range of Motion in Left ankle due to pain. She has decreased Left Ankle Strength. Patient has pain with all weight bearing activities. She has pain with sit to stand and therefore she transfers most of her weight to the Right Lower Extremity with sit to stand and with prolonged standing. During Ambulation, patient has decreased stance time and antalgic gait on the Left. Patient has significant pes planus bilaterally and is wearing non-supportive flip flops. She reports tennis shoes are painful due to her "fallen arches." Therapist educated patient on the importance of wearing shoes with good arch support.   Therapist initiated Home Exercise Program for Ankle Active Range of Motion which includes Ankle Dorsiflexion/Plantarflexion, Inversion/Eversion, ankle circles clockwise and counterclockwise, and Gastroc stretch with towel.   Patient will require Physical Therapy Intervention to address all deficits and work toward completion of all goals set. Therapist will refer patient back to MD upon completion of Therapy for further assessment and possible MRI if pain and dysfunction persist.     SILVANA Is progressing well towards her goals.   Patient prognosis is Good.     Patient will continue to benefit from skilled outpatient physical therapy to address the deficits listed in the problem list box on initial evaluation, provide pt/family education and to maximize pt's level of independence in the home and " community environment.     Patient's spiritual, cultural and educational needs considered and pt agreeable to plan of care and goals.     Anticipated barriers to physical therapy: None    Goals: Short Term Goals: 4 weeks   1. Independent with Home Exercise Program   2. Increase Left Ankle Active Range of Motion to 15 Degrees for Dorsiflexion and 45 Degrees for Plantarflexion  3. Increase Left Ankle Active Range of Motion to 30 Degrees for Inversion and 20 Degrees for Eversion  4. Increase Left Ankle Strength to grossly 3+/5  5. Patient will ambulate 500 feet with Normal Gait pattern with supportive shoes with pain less than or equal to 4/10     Long Term Goals: 8 weeks   1. Patient will increase Left Ankle Strength to grossly 4+/5  2. Patient will increase Left Ankle Active Range of Motion to Within Functional Limits all directions   3. Patient will ambulate 1000+ feet with complaints of pain less than or equal to 3/10 in Left Ankle   4. Will refer patient back to MD upon completion of Therapy for possible MRI if pain and dysfunction persists.     Plan     Plan of care Certification: 6/26/2024 to 8/23/2024.     Outpatient Physical Therapy 2 times weekly for 8 weeks to include the following interventions: 78445 [therapeutic exercise], 07263 [neuromuscular re-education], 10790 [gait training], 66093 [manual therapy], 80812 [therapeutic activities], 82393 [aquatic therapy], 09937 [ultrasound], and 86110 [unattended electrical stimulation]    Obed Slater PTA

## 2024-07-15 ENCOUNTER — CLINICAL SUPPORT (OUTPATIENT)
Dept: REHABILITATION | Facility: HOSPITAL | Age: 54
End: 2024-07-15
Payer: OTHER GOVERNMENT

## 2024-07-15 DIAGNOSIS — M25.572 ACUTE LEFT ANKLE PAIN: Primary | ICD-10-CM

## 2024-07-15 DIAGNOSIS — M25.672 DECREASED RANGE OF MOTION OF LEFT ANKLE: ICD-10-CM

## 2024-07-15 DIAGNOSIS — M76.822 LEFT TIBIALIS POSTERIOR TENDINITIS: ICD-10-CM

## 2024-07-15 DIAGNOSIS — R29.898 WEAKNESS OF LEFT FOOT: ICD-10-CM

## 2024-07-15 PROCEDURE — 97140 MANUAL THERAPY 1/> REGIONS: CPT | Mod: CQ

## 2024-07-15 PROCEDURE — 97110 THERAPEUTIC EXERCISES: CPT | Mod: CQ

## 2024-07-15 NOTE — PROGRESS NOTES
[facility]   Physical Therapy Treatment Note      Name: Dania Felton  Clinic Number: [id number]    Therapy Diagnosis:   Encounter Diagnoses   Name Primary?    Acute left ankle pain Yes    Decreased range of motion of left ankle     Weakness of left foot     Left tibialis posterior tendinitis      Physician: Latricia Muñoz NP    Visit Date: 7/15/2024    Therapy Diagnosis: Left Ankle Pain        Encounter Diagnosis   Name Primary?    Rheumatoid arthritis          Physician: Latricia Muñoz NP     Physician Orders: PT Eval and Treat   Medical Diagnosis from Referral: Rheumatoid Arthritis   Evaluation Date: 6/26/2024  Authorization Period Expiration: 9/11/1014  Plan of Care Expiration: 8/23/2024  Visit # / Visits authorized: 3 / 6   FOTO: 52/100     Precautions: Standard      Time In: 4:39 pm   Time Out: 5:18 pm   Total Appointment Time (timed & untimed codes): 39 minutes    PTA Visit #: 2/5       Subjective     Patient reports: 2-3/10 pain; felt better after last session  She was compliant with home exercise program.  Response to previous treatment: felt better after Graston  Functional change: None    Pain: 2-3/10  Location: left ankle    Prior Level of Function: Independent and Active  Current Level of Function: Significant Left Ankle pain limits all mobility     Objective      Objective Measures updated at progress report unless specified.     Treatment     DANIA received the treatments listed below:      therapeutic exercises to develop strength, endurance, ROM, and flexibility for 23 minutes including:  Bike/NuStep x 5 mins  SB Stretch 4x20 sec  Soleus Stretch 4x20 sec  HAMSTRING STRETCH stretch      Dorsiflexion blue band 20x   Plantarflexion blue band 20x   Inversion blue band 20x   Circles 20x each way        Hydraulic Ankle 4 way      Seated Heal Raises   Standing Heal Raises with ball squeeze (heels squeeze the ball) 2x10  Soleus Raises (Seated) 20# 2x10        Single  "Leg Stance - Firm   Single Leg Stance - Foam        manual therapy techniques: Joint mobilizations and Manual traction & Graston were applied to the: foot for 15 minutes, including:  Graston, see assessment  Ice Massage    neuromuscular re-education activities to improve: Balance, Coordination, Kinesthetic, and Proprioception for 0 minutes. The following activities were included:          Patient Education and Home Exercises       Education provided:   - None    Written Home Exercises Provided: Patient instructed to cont prior HEP. Exercises were reviewed and DANIA was able to demonstrate them prior to the end of the session.  DANIA demonstrated good  understanding of the education provided. See Electronic Medical Record under Patient Instructions for exercises provided during therapy sessions    Assessment     Started off session with Graston using GT-3 and GT-5 instrument using strum/scoop techniques. Some fibrotic areas noted along navicular and posterior tib tendon around medial mal. Progressed strengthening exercises with some increased pain but pain resolves after sitting about a minute. Finished off session with ice massage to help with decreasing tightness and pain. Pt tolerated session well. Will progress as able.       PMH:  Dania is a 53 y.o. female referred to outpatient Physical Therapy with a medical diagnosis of Left Ankle Pain. DANIA reports: she was walking down stairs 4/15/2024 when she slipped and fell. She landed "funny" on the Left ankle and has had significant pain since that time. She did see her doctor on 4/15/2024 and MD placed her in a walking boot. She wore this for a while but weaned herself out of it as it was too bulky. She has continued to have pain in that ankle and has great difficulty moving the ankle, standing, and walking. X Rays at the time were negative for fractures but did show degenerative changes in the midfoot and talotibial joint. Suspect soft tissue damage at this point. " "MD ordered Outpatient Physical Therapy and patient is here today for the Evaluation.   Upon palpation she has severe tenderness all along the posterior tibialis tendon. She has pain with resisted Inversion and pain with passive eversion when the post tib is placed on a stretch.  All of these tests reproduced her pain. Feel she has damage to the posterior tib tendon. Unable to rule out tendinitis vs tendon tear.   Patient presents with significantly decreased Range of Motion in Left ankle due to pain. She has decreased Left Ankle Strength. Patient has pain with all weight bearing activities. She has pain with sit to stand and therefore she transfers most of her weight to the Right Lower Extremity with sit to stand and with prolonged standing. During Ambulation, patient has decreased stance time and antalgic gait on the Left. Patient has significant pes planus bilaterally and is wearing non-supportive flip flops. She reports tennis shoes are painful due to her "fallen arches." Therapist educated patient on the importance of wearing shoes with good arch support.   Therapist initiated Home Exercise Program for Ankle Active Range of Motion which includes Ankle Dorsiflexion/Plantarflexion, Inversion/Eversion, ankle circles clockwise and counterclockwise, and Gastroc stretch with towel.   Patient will require Physical Therapy Intervention to address all deficits and work toward completion of all goals set. Therapist will refer patient back to MD upon completion of Therapy for further assessment and possible MRI if pain and dysfunction persist.     SILVANA Is progressing well towards her goals.   Patient prognosis is Good.     Patient will continue to benefit from skilled outpatient physical therapy to address the deficits listed in the problem list box on initial evaluation, provide pt/family education and to maximize pt's level of independence in the home and community environment.     Patient's spiritual, cultural and " educational needs considered and pt agreeable to plan of care and goals.     Anticipated barriers to physical therapy: None    Goals: Short Term Goals: 4 weeks   1. Independent with Home Exercise Program   2. Increase Left Ankle Active Range of Motion to 15 Degrees for Dorsiflexion and 45 Degrees for Plantarflexion  3. Increase Left Ankle Active Range of Motion to 30 Degrees for Inversion and 20 Degrees for Eversion  4. Increase Left Ankle Strength to grossly 3+/5  5. Patient will ambulate 500 feet with Normal Gait pattern with supportive shoes with pain less than or equal to 4/10     Long Term Goals: 8 weeks   1. Patient will increase Left Ankle Strength to grossly 4+/5  2. Patient will increase Left Ankle Active Range of Motion to Within Functional Limits all directions   3. Patient will ambulate 1000+ feet with complaints of pain less than or equal to 3/10 in Left Ankle   4. Will refer patient back to MD upon completion of Therapy for possible MRI if pain and dysfunction persists.     Plan     Plan of care Certification: 6/26/2024 to 8/23/2024.     Outpatient Physical Therapy 2 times weekly for 8 weeks to include the following interventions: 89814 [therapeutic exercise], 56548 [neuromuscular re-education], 80364 [gait training], 47500 [manual therapy], 59139 [therapeutic activities], 15837 [aquatic therapy], 73321 [ultrasound], and 14233 [unattended electrical stimulation]    Obed Slatre PTA

## 2024-07-23 ENCOUNTER — CLINICAL SUPPORT (OUTPATIENT)
Dept: REHABILITATION | Facility: HOSPITAL | Age: 54
End: 2024-07-23
Payer: OTHER GOVERNMENT

## 2024-07-23 DIAGNOSIS — R29.898 WEAKNESS OF LEFT FOOT: ICD-10-CM

## 2024-07-23 DIAGNOSIS — M76.822 LEFT TIBIALIS POSTERIOR TENDINITIS: ICD-10-CM

## 2024-07-23 DIAGNOSIS — M25.672 DECREASED RANGE OF MOTION OF LEFT ANKLE: ICD-10-CM

## 2024-07-23 DIAGNOSIS — M25.572 ACUTE LEFT ANKLE PAIN: Primary | ICD-10-CM

## 2024-07-23 PROCEDURE — 97140 MANUAL THERAPY 1/> REGIONS: CPT | Mod: CQ

## 2024-07-23 PROCEDURE — 97110 THERAPEUTIC EXERCISES: CPT | Mod: CQ

## 2024-07-23 NOTE — PROGRESS NOTES
[facility]   Physical Therapy Treatment Note      Name: Dania Felton  Clinic Number: [id number]    Therapy Diagnosis:   Encounter Diagnoses   Name Primary?    Acute left ankle pain Yes    Decreased range of motion of left ankle     Weakness of left foot     Left tibialis posterior tendinitis      Physician: Latricia Muñoz NP    Visit Date: 7/23/2024    Therapy Diagnosis: Left Ankle Pain        Encounter Diagnosis   Name Primary?    Rheumatoid arthritis          Physician: Latricia Muñoz NP     Physician Orders: PT Eval and Treat   Medical Diagnosis from Referral: Rheumatoid Arthritis   Evaluation Date: 6/26/2024  Authorization Period Expiration: 9/11/1014  Plan of Care Expiration: 8/23/2024  Visit # / Visits authorized: 3 / 6   FOTO: 52/100     Precautions: Standard      Time In: 3:57 pm   Time Out: 4:36 pm   Total Appointment Time (timed & untimed codes): 39 minutes    PTA Visit #: 2/5       Subjective     Patient reports: says she feels good and pain is getting a lot better; pain is 1/10 today with noticeable decreased swelling  She was compliant with home exercise program.  Response to previous treatment: felt better after Graston  Functional change: None    Pain: 1/10  Location: left ankle    Prior Level of Function: Independent and Active  Current Level of Function: Significant Left Ankle pain limits all mobility     Objective      Objective Measures updated at progress report unless specified.     Treatment     DANIA received the treatments listed below:      therapeutic exercises to develop strength, endurance, ROM, and flexibility for 28 minutes including:  Bike/NuStep x 5 mins  SB Stretch 4x20 sec  Soleus Stretch 4x20 sec  HAMSTRING STRETCH stretch 4x20 sec     Dorsiflexion blue band 20x   Plantarflexion blue band 20x   Inversion blue band 20x   Circles 30x each way        Hydraulic Ankle 4 way      Seated Heal Raises   Standing Heal Raises with ball squeeze (heels  "squeeze the ball) 2x10  Soleus Raises (Seated) 20# 2x10  Lateral Step Ups, 6" 20x         Single Leg Stance - Firm   Single Leg Stance - Foam        manual therapy techniques: Joint mobilizations and Manual traction & Graston were applied to the: foot for 10 minutes, including:  Graston, see assessment    neuromuscular re-education activities to improve: Balance, Coordination, Kinesthetic, and Proprioception for 0 minutes. The following activities were included:          Patient Education and Home Exercises       Education provided:   - None    Written Home Exercises Provided: Patient instructed to cont prior HEP. Exercises were reviewed and DANIA was able to demonstrate them prior to the end of the session.  DANIA demonstrated good  understanding of the education provided. See Electronic Medical Record under Patient Instructions for exercises provided during therapy sessions    Assessment     Continued with POC today. Started with Graston with decreased fibrotic areas noted along navicular and posterior tib tendon. Progressed posterior tib/calf strengthening exercises today with fatigue noted. Pt is making steady progress towards established goals. Pt shown which inserts to get to help with medial arch support. Will continue to progress as able.       PMH:  Dania is a 53 y.o. female referred to outpatient Physical Therapy with a medical diagnosis of Left Ankle Pain. DANIA reports: she was walking down stairs 4/15/2024 when she slipped and fell. She landed "funny" on the Left ankle and has had significant pain since that time. She did see her doctor on 4/15/2024 and MD placed her in a walking boot. She wore this for a while but weaned herself out of it as it was too bulky. She has continued to have pain in that ankle and has great difficulty moving the ankle, standing, and walking. X Rays at the time were negative for fractures but did show degenerative changes in the midfoot and talotibial joint. Suspect soft " "tissue damage at this point. MD ordered Outpatient Physical Therapy and patient is here today for the Evaluation.   Upon palpation she has severe tenderness all along the posterior tibialis tendon. She has pain with resisted Inversion and pain with passive eversion when the post tib is placed on a stretch.  All of these tests reproduced her pain. Feel she has damage to the posterior tib tendon. Unable to rule out tendinitis vs tendon tear.   Patient presents with significantly decreased Range of Motion in Left ankle due to pain. She has decreased Left Ankle Strength. Patient has pain with all weight bearing activities. She has pain with sit to stand and therefore she transfers most of her weight to the Right Lower Extremity with sit to stand and with prolonged standing. During Ambulation, patient has decreased stance time and antalgic gait on the Left. Patient has significant pes planus bilaterally and is wearing non-supportive flip flops. She reports tennis shoes are painful due to her "fallen arches." Therapist educated patient on the importance of wearing shoes with good arch support.   Therapist initiated Home Exercise Program for Ankle Active Range of Motion which includes Ankle Dorsiflexion/Plantarflexion, Inversion/Eversion, ankle circles clockwise and counterclockwise, and Gastroc stretch with towel.   Patient will require Physical Therapy Intervention to address all deficits and work toward completion of all goals set. Therapist will refer patient back to MD upon completion of Therapy for further assessment and possible MRI if pain and dysfunction persist.     SILVANA Is progressing well towards her goals.   Patient prognosis is Good.     Patient will continue to benefit from skilled outpatient physical therapy to address the deficits listed in the problem list box on initial evaluation, provide pt/family education and to maximize pt's level of independence in the home and community environment.     Patient's " spiritual, cultural and educational needs considered and pt agreeable to plan of care and goals.     Anticipated barriers to physical therapy: None    Goals: Short Term Goals: 4 weeks   1. Independent with Home Exercise Program   2. Increase Left Ankle Active Range of Motion to 15 Degrees for Dorsiflexion and 45 Degrees for Plantarflexion  3. Increase Left Ankle Active Range of Motion to 30 Degrees for Inversion and 20 Degrees for Eversion  4. Increase Left Ankle Strength to grossly 3+/5  5. Patient will ambulate 500 feet with Normal Gait pattern with supportive shoes with pain less than or equal to 4/10     Long Term Goals: 8 weeks   1. Patient will increase Left Ankle Strength to grossly 4+/5  2. Patient will increase Left Ankle Active Range of Motion to Within Functional Limits all directions   3. Patient will ambulate 1000+ feet with complaints of pain less than or equal to 3/10 in Left Ankle   4. Will refer patient back to MD upon completion of Therapy for possible MRI if pain and dysfunction persists.     Plan     Plan of care Certification: 6/26/2024 to 8/23/2024.     Outpatient Physical Therapy 2 times weekly for 8 weeks to include the following interventions: 84434 [therapeutic exercise], 10626 [neuromuscular re-education], 23619 [gait training], 03236 [manual therapy], 79555 [therapeutic activities], 04118 [aquatic therapy], 46811 [ultrasound], and 58493 [unattended electrical stimulation]    bOed Slater PTA

## 2024-07-30 ENCOUNTER — CLINICAL SUPPORT (OUTPATIENT)
Dept: REHABILITATION | Facility: HOSPITAL | Age: 54
End: 2024-07-30
Payer: OTHER GOVERNMENT

## 2024-07-30 DIAGNOSIS — M76.822 LEFT TIBIALIS POSTERIOR TENDINITIS: ICD-10-CM

## 2024-07-30 DIAGNOSIS — M25.672 DECREASED RANGE OF MOTION OF LEFT ANKLE: ICD-10-CM

## 2024-07-30 DIAGNOSIS — M25.572 ACUTE LEFT ANKLE PAIN: Primary | ICD-10-CM

## 2024-07-30 DIAGNOSIS — R29.898 WEAKNESS OF LEFT FOOT: ICD-10-CM

## 2024-07-30 PROCEDURE — 97140 MANUAL THERAPY 1/> REGIONS: CPT | Mod: CQ

## 2024-07-30 PROCEDURE — 97110 THERAPEUTIC EXERCISES: CPT | Mod: CQ

## 2024-07-30 NOTE — PROGRESS NOTES
[facility]   Physical Therapy Treatment Note      Name: Dania Leslie Jose Francisco  Clinic Number: [id number]    Therapy Diagnosis:   Encounter Diagnoses   Name Primary?    Acute left ankle pain Yes    Decreased range of motion of left ankle     Weakness of left foot     Left tibialis posterior tendinitis      Physician: Latricia Muñoz NP    Visit Date: 7/30/2024     Physician Orders: PT Eval and Treat   Medical Diagnosis from Referral: Rheumatoid Arthritis   Evaluation Date: 6/26/2024  Authorization Period Expiration: 9/11/1014  Plan of Care Expiration: 8/23/2024  Visit # / Visits authorized: 5 / 6   PTA Visit #: 4/5   FOTO: 52/100     Precautions: Standard      Time In: 4:00 pm   Time Out: 4:33 pm   Total Appointment Time (timed & untimed codes): 33 minutes (23  individual minutes and 10 minutes NB)    Subjective     Patient reports: she is doing really good, says she feels like her left ankle pain has decreased a lot since starting therapy.  pain is 1/10 today with noticeable decreased swelling  She was compliant with home exercise program.  Response to previous treatment: felt better  Functional change: None    Pain: 1/10  Location: left ankle    Prior Level of Function: Independent and Active  Current Level of Function: Significant Left Ankle pain limits all mobility     Objective      Objective Measures updated at progress report unless specified.     Treatment     DANIA received the treatments listed below:      therapeutic exercises to develop strength, endurance, ROM, and flexibility for 23 minutes including:    Bike/NuStep x 5 mins  SB Stretch 4x20 sec  Soleus Stretch 4x20 sec  HAMSTRING STRETCH stretch 4x20 sec     Dorsiflexion blue band 20x   Plantarflexion blue band 20x   Inversion blue band 20x   Circles 30x each way     Hydraulic Ankle 4 way      Seated Heal Raises   Standing Heal Raises with ball squeeze (heels squeeze the ball) 2x10  Soleus Raises (Seated) 4 plates  "2x10  Lateral Step Ups, 6" 20x     Single Leg Stance - Firm   Single Leg Stance - Foam     manual therapy techniques: Joint mobilizations and Manual traction & Graston were applied to the: foot for 0 minutes, including:  IASTM , see assessment    neuromuscular re-education activities to improve: Balance, Coordination, Kinesthetic, and Proprioception for 0 minutes. The following activities were included:    Patient Education and Home Exercises       Education provided:   - None    Written Home Exercises Provided: Patient instructed to cont prior HEP. Exercises were reviewed and DANIA was able to demonstrate them prior to the end of the session.  DANIA demonstrated good  understanding of the education provided. See Electronic Medical Record under Patient Instructions for exercises provided during therapy sessions    Assessment     Continued with POC today. Started with IASTM with decreased fibrotic areas noted along navicular and posterior tib tendon. Progressed posterior tib/calf strengthening exercises today with fatigue noted. Pt is making steady progress towards established goals. Pt shown which inserts to get to help with medial arch support. Will continue to progress as able. Time billed reflects time spent one on one with Patient.     PMH:  Dania is a 53 y.o. female referred to outpatient Physical Therapy with a medical diagnosis of Left Ankle Pain. DANIA reports: she was walking down stairs 4/15/2024 when she slipped and fell. She landed "funny" on the Left ankle and has had significant pain since that time. She did see her doctor on 4/15/2024 and MD placed her in a walking boot. She wore this for a while but weaned herself out of it as it was too bulky. She has continued to have pain in that ankle and has great difficulty moving the ankle, standing, and walking. X Rays at the time were negative for fractures but did show degenerative changes in the midfoot and talotibial joint. Suspect soft tissue damage at " "this point. MD ordered Outpatient Physical Therapy and patient is here today for the Evaluation.   Upon palpation she has severe tenderness all along the posterior tibialis tendon. She has pain with resisted Inversion and pain with passive eversion when the post tib is placed on a stretch.  All of these tests reproduced her pain. Feel she has damage to the posterior tib tendon. Unable to rule out tendinitis vs tendon tear.   Patient presents with significantly decreased Range of Motion in Left ankle due to pain. She has decreased Left Ankle Strength. Patient has pain with all weight bearing activities. She has pain with sit to stand and therefore she transfers most of her weight to the Right Lower Extremity with sit to stand and with prolonged standing. During Ambulation, patient has decreased stance time and antalgic gait on the Left. Patient has significant pes planus bilaterally and is wearing non-supportive flip flops. She reports tennis shoes are painful due to her "fallen arches." Therapist educated patient on the importance of wearing shoes with good arch support.   Therapist initiated Home Exercise Program for Ankle Active Range of Motion which includes Ankle Dorsiflexion/Plantarflexion, Inversion/Eversion, ankle circles clockwise and counterclockwise, and Gastroc stretch with towel.   Patient will require Physical Therapy Intervention to address all deficits and work toward completion of all goals set. Therapist will refer patient back to MD upon completion of Therapy for further assessment and possible MRI if pain and dysfunction persist.     SILVANA Is progressing well towards her goals.   Patient prognosis is Good.     Patient will continue to benefit from skilled outpatient physical therapy to address the deficits listed in the problem list box on initial evaluation, provide pt/family education and to maximize pt's level of independence in the home and community environment.     Patient's spiritual, " cultural and educational needs considered and pt agreeable to plan of care and goals.     Anticipated barriers to physical therapy: None    Goals: Short Term Goals: 4 weeks   1. Independent with Home Exercise Program   2. Increase Left Ankle Active Range of Motion to 15 Degrees for Dorsiflexion and 45 Degrees for Plantarflexion  3. Increase Left Ankle Active Range of Motion to 30 Degrees for Inversion and 20 Degrees for Eversion  4. Increase Left Ankle Strength to grossly 3+/5  5. Patient will ambulate 500 feet with Normal Gait pattern with supportive shoes with pain less than or equal to 4/10     Long Term Goals: 8 weeks   1. Patient will increase Left Ankle Strength to grossly 4+/5  2. Patient will increase Left Ankle Active Range of Motion to Within Functional Limits all directions   3. Patient will ambulate 1000+ feet with complaints of pain less than or equal to 3/10 in Left Ankle   4. Will refer patient back to MD upon completion of Therapy for possible MRI if pain and dysfunction persists.     Plan     Plan of care Certification: 6/26/2024 to 8/23/2024.     Outpatient Physical Therapy 2 times weekly for 8 weeks to include the following interventions: 07148 [therapeutic exercise], 23839 [neuromuscular re-education], 32460 [gait training], 38740 [manual therapy], 77824 [therapeutic activities], 25928 [aquatic therapy], 84121 [ultrasound], and 41314 [unattended electrical stimulation]    Rachid Hilario PTA    7/30/2024

## 2024-08-09 ENCOUNTER — CLINICAL SUPPORT (OUTPATIENT)
Dept: REHABILITATION | Facility: HOSPITAL | Age: 54
End: 2024-08-09
Payer: OTHER GOVERNMENT

## 2024-08-09 DIAGNOSIS — M25.572 ACUTE LEFT ANKLE PAIN: Primary | ICD-10-CM

## 2024-08-09 DIAGNOSIS — M25.672 DECREASED RANGE OF MOTION OF LEFT ANKLE: ICD-10-CM

## 2024-08-09 DIAGNOSIS — R29.898 WEAKNESS OF LEFT FOOT: ICD-10-CM

## 2024-08-09 DIAGNOSIS — M76.822 LEFT TIBIALIS POSTERIOR TENDINITIS: ICD-10-CM

## 2024-08-09 PROCEDURE — 97530 THERAPEUTIC ACTIVITIES: CPT

## 2024-08-09 PROCEDURE — 97110 THERAPEUTIC EXERCISES: CPT

## 2025-09-03 ENCOUNTER — OFFICE VISIT (OUTPATIENT)
Dept: FAMILY MEDICINE | Facility: CLINIC | Age: 55
End: 2025-09-03
Payer: COMMERCIAL

## 2025-09-03 VITALS
WEIGHT: 257 LBS | DIASTOLIC BLOOD PRESSURE: 84 MMHG | RESPIRATION RATE: 18 BRPM | SYSTOLIC BLOOD PRESSURE: 110 MMHG | HEIGHT: 62 IN | TEMPERATURE: 98 F | HEART RATE: 81 BPM | BODY MASS INDEX: 47.29 KG/M2

## 2025-09-03 DIAGNOSIS — Z13.220 SCREENING FOR LIPOID DISORDERS: ICD-10-CM

## 2025-09-03 DIAGNOSIS — Z00.00 ROUTINE GENERAL MEDICAL EXAMINATION AT A HEALTH CARE FACILITY: Primary | ICD-10-CM

## 2025-09-03 DIAGNOSIS — Z13.1 SCREENING FOR DIABETES MELLITUS: ICD-10-CM

## 2025-09-03 LAB
CHOLEST SERPL-MCNC: 199 MG/DL
CHOLEST/HDLC SERPL: 3.3 {RATIO}
GLUCOSE SERPL-MCNC: 80 MG/DL (ref 70–100)
HDLC SERPL-MCNC: 61 MG/DL (ref 35–60)
LDLC SERPL CALC-MCNC: 122 MG/DL
LDLC/HDLC SERPL: 2 {RATIO}
NONHDLC SERPL-MCNC: 138 MG/DL
TRIGL SERPL-MCNC: 82 MG/DL (ref 37–140)
VLDLC SERPL-MCNC: 16 MG/DL

## 2025-09-03 PROCEDURE — 80061 LIPID PANEL: CPT | Mod: ,,, | Performed by: CLINICAL MEDICAL LABORATORY

## 2025-09-03 PROCEDURE — 82947 ASSAY GLUCOSE BLOOD QUANT: CPT | Mod: ,,, | Performed by: CLINICAL MEDICAL LABORATORY

## 2025-09-03 RX ORDER — BUSPIRONE HYDROCHLORIDE 30 MG/1
30 TABLET ORAL DAILY
COMMUNITY

## 2025-09-03 RX ORDER — POTASSIUM CHLORIDE 20 MEQ/1
20 TABLET, EXTENDED RELEASE ORAL DAILY
COMMUNITY
Start: 2025-02-04

## 2025-09-03 RX ORDER — CHLORTHALIDONE 25 MG/1
25 TABLET ORAL DAILY
COMMUNITY

## 2025-09-03 RX ORDER — MAGNESIUM OXIDE 420 MG/1
420 TABLET ORAL DAILY
COMMUNITY
Start: 2025-03-26

## 2025-09-03 RX ORDER — QUETIAPINE FUMARATE 400 MG/1
400 TABLET, FILM COATED ORAL NIGHTLY
COMMUNITY
Start: 2025-05-12

## 2025-09-03 RX ORDER — FLUTICASONE PROPIONATE 50 MCG
2 SPRAY, SUSPENSION (ML) NASAL DAILY
COMMUNITY
Start: 2025-04-29

## 2025-09-04 ENCOUNTER — PATIENT OUTREACH (OUTPATIENT)
Facility: HOSPITAL | Age: 55
End: 2025-09-04
Payer: COMMERCIAL

## 2025-09-04 ENCOUNTER — TELEPHONE (OUTPATIENT)
Dept: FAMILY MEDICINE | Facility: CLINIC | Age: 55
End: 2025-09-04
Payer: COMMERCIAL